# Patient Record
Sex: FEMALE | Race: WHITE | Employment: OTHER | URBAN - METROPOLITAN AREA
[De-identification: names, ages, dates, MRNs, and addresses within clinical notes are randomized per-mention and may not be internally consistent; named-entity substitution may affect disease eponyms.]

---

## 2017-09-07 ENCOUNTER — OFFICE VISIT (OUTPATIENT)
Dept: FAMILY MEDICINE | Facility: OTHER | Age: 62
End: 2017-09-07

## 2017-09-07 ENCOUNTER — RADIANT APPOINTMENT (OUTPATIENT)
Dept: GENERAL RADIOLOGY | Facility: OTHER | Age: 62
End: 2017-09-07
Attending: FAMILY MEDICINE

## 2017-09-07 VITALS
TEMPERATURE: 97.9 F | OXYGEN SATURATION: 98 % | SYSTOLIC BLOOD PRESSURE: 102 MMHG | WEIGHT: 130 LBS | RESPIRATION RATE: 16 BRPM | DIASTOLIC BLOOD PRESSURE: 62 MMHG | HEART RATE: 77 BPM | BODY MASS INDEX: 24.56 KG/M2

## 2017-09-07 DIAGNOSIS — M54.41 ACUTE RIGHT-SIDED LOW BACK PAIN WITH RIGHT-SIDED SCIATICA: ICD-10-CM

## 2017-09-07 DIAGNOSIS — M54.41 ACUTE RIGHT-SIDED LOW BACK PAIN WITH RIGHT-SIDED SCIATICA: Primary | ICD-10-CM

## 2017-09-07 PROCEDURE — 99214 OFFICE O/P EST MOD 30 MIN: CPT | Performed by: FAMILY MEDICINE

## 2017-09-07 PROCEDURE — 72100 X-RAY EXAM L-S SPINE 2/3 VWS: CPT

## 2017-09-07 RX ORDER — CYCLOBENZAPRINE HCL 10 MG
10 TABLET ORAL
Qty: 14 TABLET | Refills: 1 | Status: SHIPPED | OUTPATIENT
Start: 2017-09-07 | End: 2019-07-22

## 2017-09-07 ASSESSMENT — PAIN SCALES - GENERAL: PAINLEVEL: MODERATE PAIN (5)

## 2017-09-07 NOTE — NURSING NOTE
Screening Questionnaire for Adult Immunization    Are you sick today?   No   Do you have allergies to medications, food, a vaccine component or latex?   No   Have you ever had a serious reaction after receiving a vaccination?   No   Do you have a long-term health problem with heart disease, lung disease, asthma, kidney disease, metabolic disease (e.g. diabetes), anemia, or other blood disorder?   No   Do you have cancer, leukemia, HIV/AIDS, or any other immune system problem?   No   In the past 3 months, have you taken medications that affect  your immune system, such as prednisone, other steroids, or anticancer drugs; drugs for the treatment of rheumatoid arthritis, Crohn s disease, or psoriasis; or have you had radiation treatments?   No   Have you had a seizure, or a brain or other nervous system problem?   No   During the past year, have you received a transfusion of blood or blood     products, or been given immune (gamma) globulin or antiviral drug?   No   For women: Are you pregnant or is there a chance you could become        pregnant during the next month?   No   Have you received any vaccinations in the past 4 weeks?   No     Immunization questionnaire answers were all negative.      [facility] doesn't apply on this patient    Prior to injection verified patient identity using patient's name and date of birth.   Patient instructed to remain in clinic for 15 minutes afterwards, and to report any adverse reaction to me immediately.    Injectable Influenza Immunization Documentation    1.  Is the person to be vaccinated sick today?  No    2. Does the person to be vaccinated have an allergy to eggs or to a component of the vaccine?  No    3. Has the person to be vaccinated today ever had a serious reaction to influenza vaccine in the past?  No    4. Has the person to be vaccinated ever had Guillain-[place] syndrome?  No     Form completed by Sandy Wright MA

## 2017-09-07 NOTE — PATIENT INSTRUCTIONS
Try to avoid caffeine and nicotine.   Stay well hydrated.  Adjust sleep positioning to align spine.  Use oral and topical anti-inflammatories to alleviate muscular inflammation (Ibuprofen and Biofreeze are good options).   Use Flexeril as needed to relax muscles before bed. PT if no improvement.

## 2017-09-07 NOTE — LETTER
[address]  [facility] [address]  Phone: [phone]    September 7, 2017        Rach Dick  [address] [facility] [address]          To whom it may concern:    RE: Rach Dick    Patient was seen and treated today at our clinic and missed work.  She may be out for the next few days while healing.    Please contact me for questions or concerns.      Sincerely,        Bobbi Saxena MD, MD

## 2017-09-07 NOTE — MR AVS SNAPSHOT
After Visit Summary   9/7/2017    Rach Dick    MRN: [id number]           Patient Information     Date Of Birth          1955        Visit Information        Provider Department      9/7/2017 4:00 PM Bobbi Saxena MD [facility]        Today's Diagnoses     Acute right-sided low back pain with right-sided sciatica    -  1      Care Instructions    Try to avoid caffeine and nicotine.   Stay well hydrated.  Adjust sleep positioning to align spine.  Use oral and topical anti-inflammatories to alleviate muscular inflammation (Ibuprofen and Biofreeze are good options).   Use Flexeril as needed to relax muscles before bed.           Follow-ups after your visit        Who to contact     If you have questions or need follow up information about today's clinic visit or your schedule please contact [facility] directly at [phone].  Normal or non-critical lab and imaging results will be communicated to you by [employer], letter or phone within 4 business days after the clinic has received the results. If you do not hear from us within 7 days, please contact the clinic through [employer] or phone. If you have a critical or abnormal lab result, we will notify you by phone as soon as possible.  Submit refill requests through [employer] or call your pharmacy and they will forward the refill request to us. Please allow 3 business days for your refill to be completed.          Additional Information About Your Visit        MyChart Information     [employer] gives you secure access to your electronic health record. If you see a primary care provider, you can also send messages to your care team and make appointments. If you have questions, please call your primary care clinic.  If you do not have a primary care provider, please call [phone] and they will assist you.        Care EveryWhere ID     This is your Care EveryWhere ID. This could be used by other organizations to  access your [place] medical records  [id number]        Your Vitals Were     Pulse Temperature Respirations Pulse Oximetry BMI (Body Mass Index)       77 97.9  F (36.6  C) (Temporal) 16 98% 24.56 kg/m2        Blood Pressure from Last 3 Encounters:   09/07/17 102/62   06/29/16 118/80   06/08/16 110/60    Weight from Last 3 Encounters:   09/07/17 130 lb (59 kg)   06/08/16 132 lb (59.9 kg)   03/15/16 120 lb (54.4 kg)                 Today's Medication Changes          These changes are accurate as of: 9/7/17  4:44 PM.  If you have any questions, ask your nurse or doctor.               Start taking these medicines.        Dose/Directions    cyclobenzaprine 10 MG tablet   Commonly known as:  FLEXERIL   Used for:  Acute right-sided low back pain with right-sided sciatica   Started by:  Bobbi Saxena MD        Dose:  10 mg   Take 1 tablet (10 mg) by mouth nightly as needed for muscle spasms   Quantity:  14 tablet   Refills:  1         Stop taking these medicines if you haven't already. Please contact your care team if you have questions.     phenazopyridine 200 MG tablet   Commonly known as:  PYRIDIUM   Stopped by:  Bobbi Saxena MD                Where to get your medicines      These medications were sent to [address]  [address] [facility] [address]     Phone:  [phone]     cyclobenzaprine 10 MG tablet                Primary Care Provider Office Phone # Fax #    Venessa Chavez PA-C [phone] [address] [facility]   [facility] [address]        Equal Access to Services     [facility] AH: Hadii carmen denis hadashred Soanya, waaxda luqadaha, qaybta kaalmada jack, adele alexander. So [facility] [phone].    ATENCIÓN: Si habla español, tiene a cervantes disposición servicios gratuitos de asistencia lingüística. Llame al [phone].    We comply with applicable federal civil rights laws and [place] laws. We do not discriminate on the  basis of race, color, national origin, age, disability sex, sexual orientation or gender identity.            Thank you!     Thank you for choosing [facility]  for your care. Our goal is always to provide you with excellent care. Hearing back from our patients is one way we can continue to improve our services. Please take a few minutes to complete the written survey that you may receive in the mail after your visit with us. Thank you!             Your Updated Medication List - Protect others around you: Learn how to safely use, store and throw away your medicines at www.disposemymeds.org.          This list is accurate as of: 9/7/17  4:44 PM.  Always use your most recent med list.                   Brand Name Dispense Instructions for use Diagnosis    CALCIUM + D PO      2 TABLET ONCE A DAY    Labyrinthitis       chromium 200 MCG Caps capsule      Take 200 mcg by mouth daily.        cyclobenzaprine 10 MG tablet    FLEXERIL    14 tablet    Take 1 tablet (10 mg) by mouth nightly as needed for muscle spasms    Acute right-sided low back pain with right-sided sciatica       GLUCOSAMINE CHONDRO COMPLEX OR      TAKE TWO CAPSULES DAILY.        MULTIVITAMIN PO      TAKE ONE TABLET DAILY.        OMEGA 3 PO      TAKE TWO CAPSULES DAILY.        ZYRTEC ALLERGY 10 MG tablet   Generic drug:  cetirizine      Take 10 mg by mouth daily

## 2017-09-07 NOTE — PROGRESS NOTES
"  SUBJECTIVE:                                                    Rach Dick is a 62 year old female who presents to clinic today for the following health issues:    HPI    Back Pain       Duration: in last week         Specific cause: off and on throughout the summer, sitting/ bending have caused a flare up     Description:   Location of pain: low back right   Character of pain: sharp  Pain radiation:radiates into the right buttocks  New numbness or weakness in legs, not attributed to pain:  no     Intensity: Currently 5/10, At its worst 8/10    History:   Pain interferes with job: YES  History of back problems: pt states she has had some bulging disk problems   Any previous MRI or X-rays: yes but cant remember where   Sees a specialist for back pain:  No  Therapies tried without relief: cold, heat, aleve, stretching     Alleviating factors:   Improved by: no movement       Precipitating factors:  Worsened by: Bending, Sitting and Walking    Functional and Psychosocial Screen (Dian STarT Back):      Not performed today    Accompanying Signs & Symptoms:  Risk of Fracture:  Osteoporosis  Risk of Cauda Equina:  None  Risk of Infection:  None  Risk of Cancer:  None  Risk of Ankylosing Spondylitis:  Onset at age <35, male, AND morning back stiffness. no     Patient states that she has been experiencing back pain since Tuesday (09/05/2017) when she tried to lift a book off of the bottom shelf at her place of employment. She states that she thought it was just a kink in her back, but the pain worsened later that night. She describes that her back seems to \"lock out\" when she stands up. She also explains that she has tried Aleve and heating pads, but these treatments have not alleviated the pain.     Problem list and histories reviewed & adjusted, as indicated.  Additional history: as documented    Patient Active Problem List   Diagnosis     Essential and other specified forms of tremor     Irritable bowel syndrome "     Hypoglycemia     Hip pain     Lumbar radiculopathy     Bunion     Fibrocystic breast changes     CARDIOVASCULAR SCREENING; LDL GOAL LESS THAN 160     Osteopenia     Health Care Home     Advanced directives, counseling/discussion     Pain in shoulder     RSD (reflex sympathetic dystrophy)     Trochanteric bursitis     H/O: hysterectomy     Adenomatous polyp of colon     Past Surgical History:   Procedure Laterality Date     C NONSPECIFIC PROCEDURE      knee surgery     C NONSPECIFIC PROCEDURE  1993    lithotripsy     C OPEN RX DIST RAD/ULNA FX  12/06/2004    ORIF right distal radius fx     C TOTAL ABDOM HYSTERECTOMY      Hysterectomy, Total Abdominal removed one ovary     COLONOSCOPY  10/27/08     HC COLONOSCOPY W/WO BRUSH/WASH  09/12/05     HC CYSTOURETHROSCOPY  10/18/2004    Vaginal biopsy with cystoscopic guidance.     HC EXCISION BREAST LESION W XRAY MARKER, OPEN SINGLE  4/10/2007    Left      HC REMOVAL GALLBLADDER  1991    Cholecystectomy       Social History   Substance Use Topics     Smoking status: Never Smoker     Smokeless tobacco: Never Used      Comment: no smokers in the household     Alcohol use No     Family History   Problem Relation Age of Onset     CANCER Other      DIABETES Other      DIABETES Other      Breast Cancer Other      ? breast - Pat GM     Psychotic Disorder Mother      bi-polar     OSTEOPOROSIS Mother      Neurologic Disorder Father      parkinsons     HEART DISEASE Father      heart murmur     Psychotic Disorder Sister      both sisters bi-polar         ROS:  Constitutional, HEENT, cardiovascular, pulmonary, GI, , musculoskeletal, neuro, skin, endocrine and psych systems are negative, except as in HPI or otherwise noted.     This document serves as a record of the services and decisions personally performed and made by Bobbi Saxena MD. It was created on her behalf by Carlos Eduardo Parry, a trained medical scribe. The creation of this document is based the provider's statements to the  medical scribe.  Carlos Eduardo Parry, September 7, 2017 4:12 PM     OBJECTIVE:                                                    /62  Pulse 77  Temp 97.9  F (36.6  C) (Temporal)  Resp 16  Wt 59 kg (130 lb)  SpO2 98%  BMI 24.56 kg/m2  Body mass index is 24.56 kg/(m^2).   GENERAL: healthy, alert, well nourished, well hydrated, no distress  SKIN: no suspicious lesions, no rashes to visible skin  PSYCH: Alert and oriented times 3; speech- coherent , normal rate and volume; able to articulate logical thoughts, able to abstract reason, no tangential thoughts, no hallucinations or delusions, affect- normal    Back Exam     Tenderness   The patient is experiencing tenderness in the lumbar.    Range of Motion   Flexion:                   Normal  Extension:                 Lateral Bend Left:    Normal  Lateral Bend Right:  Abnormal  Rotation Right:         Abnormal  Rotation Left:           Normal    Reflexes   Patellar:  Hyperreflexic  Achilles:  Normal    Comments:  Pain upon right lateral bend and inability to rotate right.     Right side paraspinous muscle discomfort. Paraspinous muscle spasm and irritation to the right side.          No results found for this or any previous visit (from the past 24 hour(s)).       ASSESSMENT/PLAN:                                                        ICD-10-CM    1. Acute right-sided low back pain with right-sided sciatica M54.41 XR Lumbar Spine 2/3 Views       Discussed possibility of degenerative disc disease or bulging disc causign her pain. Patient was Advised to reduce caffeine and nicotine consumption. She was also advised to remain hydrated. Options of injections, physical therapy, and muscle relaxants are discussed. Treatment option will be further discussed upon examination of lumbar XR.     Lumbar XR ordered. Normal results were noted.     After examination of the XR, injections would not be a good treatment option. The patient was advised to adjust her sleep  positioning to align the spine and give the muscles space to relax. Inflammation of the muscles can be treated with oral and topical anti-inflammatories. Patient was also advised to use Flexeril before bed for muscle relaxation.    Work form was filled out for patient in case her pain interferes with her work responsibilities.     Medications ordered (see above). Side effects were discussed and were understood by the patient.     Patient Instructions   Try to avoid caffeine and nicotine.   Stay well hydrated.    The information in this document, created by the medical scribe for me, accurately reflects the services I personally performed and the decisions made by me. I have reviewed and approved this document for accuracy.   MD Bobbi Webster MD, MD  [facility]

## 2017-09-08 NOTE — PROGRESS NOTES
Rach, your results were as discussed in clinic.  Please let me know if you have any questions.    Bobbi Saxena MD

## 2017-10-20 ENCOUNTER — TRANSFERRED RECORDS (OUTPATIENT)
Dept: HEALTH INFORMATION MANAGEMENT | Facility: CLINIC | Age: 62
End: 2017-10-20

## 2017-10-23 ENCOUNTER — OFFICE VISIT (OUTPATIENT)
Dept: ORTHOPEDICS | Facility: CLINIC | Age: 62
End: 2017-10-23
Payer: COMMERCIAL

## 2017-10-23 ENCOUNTER — RADIANT APPOINTMENT (OUTPATIENT)
Dept: GENERAL RADIOLOGY | Facility: CLINIC | Age: 62
End: 2017-10-23
Attending: ORTHOPAEDIC SURGERY
Payer: COMMERCIAL

## 2017-10-23 ENCOUNTER — TELEPHONE (OUTPATIENT)
Dept: FAMILY MEDICINE | Facility: CLINIC | Age: 62
End: 2017-10-23

## 2017-10-23 VITALS — WEIGHT: 126 LBS | BODY MASS INDEX: 23.79 KG/M2 | TEMPERATURE: 98 F | HEIGHT: 61 IN

## 2017-10-23 DIAGNOSIS — M25.572 ANKLE PAIN, LEFT: ICD-10-CM

## 2017-10-23 DIAGNOSIS — S82.65XA CLOSED NONDISPLACED FRACTURE OF LATERAL MALLEOLUS OF LEFT FIBULA, INITIAL ENCOUNTER: Primary | ICD-10-CM

## 2017-10-23 PROCEDURE — 73610 X-RAY EXAM OF ANKLE: CPT | Mod: TC

## 2017-10-23 PROCEDURE — 27786 TREATMENT OF ANKLE FRACTURE: CPT | Mod: LT | Performed by: ORTHOPAEDIC SURGERY

## 2017-10-23 ASSESSMENT — PAIN SCALES - GENERAL: PAINLEVEL: NO PAIN (0)

## 2017-10-23 NOTE — LETTER
10/23/2017         RE: Rach Dick  [address] [facility] [address]        Dear Colleague,    Thank you for referring your patient, Rach Dick, to the [facility]. Please see a copy of my visit note below.    Rach Dick is a 62 year old female who is seen in consultation at the request of .  History of Present illness:  Rach presents for evaluation of:  1.) left tib fib /ankle  2.)   Onset: 10/20/17  Symptoms brought on by fall.   Character:  sharp, dull ache, stabbing and throbbing.    Progression of symptoms:  better.    Previous similar pain: no .   Pain Level:  1/10.   Previous treatments:  support wrap and Ibuprofen.  Currently on Blood thinners? no  Diagnosis of Diabetes? 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    She is being seen for   Chief Complaints and History of Present Illnesses   Patient presents with     Consult     left tib fib fx /ankle         History of Present Illness:   Rach Dick is a 62 year old female who is seen in consultation at the request of .  History of Present illness:  Rach presents for evaluation of:  1.) left tib fib /ankle  Onset: 10/20/17, patient was walking down a decline and slipped on some pebble gravel and injured her left ankle.  Symptoms brought on by fall.   Character:  sharp, dull ache, stabbing and throbbing.    Progression of symptoms:  better.    Previous similar pain: no. Patient denies any previous surgery or major injury of the left ankle   Pain Level:  1/10.   Previous treatments:  patient was seen on the day she was injured at [facility] in the emergency department. X-rays were done and she was placed in a splint that was molded for reduction. Patient was given Norco as well as ibuprofen 600 mg. She states that she took the dark only for a day  because she did not like how it made her feel. She is taking ibuprofen 600 mg since then. She has been using crutches.  Currently on Blood thinners? no  Diagnosis of Diabetes? no  Additional History: patient denies any major numbness and tingling just some on and off tingling of her toes.    Patient's past medical, surgical, social and family histories reviewed.     Past Medical History:   Diagnosis Date     Adenomatous polyp of colon 2013    q 5 yr colonoscopy recommended     Calculus of kidney     recurrent     H/O: hysterectomy     PAPS NO LONGER INDICATED     Hemangioma NOS 2008    ? diagnosis liver     Lumbago      Lumbar radiculopathy 11/17/2008    L5 nerve impingement - see MRI     Mild intermittent asthma 7/12/2001     Nausea with vomiting      Unspecified gastritis and gastroduodenitis without mention of hemorrhage          Past Surgical History:   Procedure Laterality Date     C NONSPECIFIC PROCEDURE      knee surgery     C NONSPECIFIC PROCEDURE  1993    lithotripsy     C OPEN RX DIST RAD/ULNA FX  12/06/2004    ORIF right distal radius fx     C TOTAL ABDOM HYSTERECTOMY      Hysterectomy, Total Abdominal removed one ovary     COLONOSCOPY  10/27/08     HC COLONOSCOPY W/WO BRUSH/WASH  09/12/05     HC CYSTOURETHROSCOPY  10/18/2004    Vaginal biopsy with cystoscopic guidance.     HC EXCISION BREAST LESION W XRAY MARKER, OPEN SINGLE  4/10/2007    Left      HC REMOVAL GALLBLADDER  1991    Cholecystectomy       Medications:    Current Outpatient Prescriptions on File Prior to Visit:  cyclobenzaprine (FLEXERIL) 10 MG tablet Take 1 tablet (10 mg) by mouth nightly as needed for muscle spasms   cetirizine (ZYRTEC ALLERGY) 10 MG tablet Take 10 mg by mouth daily   chromium 200 MCG CAPS Take 200 mcg by mouth daily.   CALCIUM + D OR 2 TABLET ONCE A DAY   OMEGA 3 OR TAKE TWO CAPSULES DAILY.   GLUCOSAMINE CHONDRO COMPLEX OR TAKE TWO CAPSULES DAILY.   MULTIVITAMIN OR TAKE ONE TABLET DAILY.     No current  "facility-administered medications on file prior to visit.     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 breast - Pat GM     Psychotic Disorder Mother      bi-polar     OSTEOPOROSIS Mother      Neurologic Disorder Father      parkinsons     HEART DISEASE Father      heart murmur     Psychotic Disorder Sister      both sisters bi-polar       REVIEW OF SYSTEMS  10 point review systems performed otherwise negative as noted as per history of present illness.    Physical Exam:  Vitals: Temp 98  F (36.7  C)  Ht 1.549 m (5' 1\")  Wt 57.2 kg (126 lb)  BMI 23.81 kg/m2  BMI= Body mass index is 23.81 kg/(m^2).    Constitutional: healthy, alert and no acute distress   Psychiatric: mentation appears normal and affect normal/bright  NEURO: no focal deficits, CMS intact left lower extremity of the area that I can examine which is her upper leg and toes because the splint is in place.  RESP: Normal with easy respirations and no use of accessory muscles to breathe, no audible wheezing or retractions  CV: left toes all moving and toes are warm to the touch.  SKIN: No erythema, rashes, excoriation, or breakdown. No evidence of infection Of the skin that I can see today pit, patient has a short leg splint on. No abrasions from the " splint.  MUSCULOSKELETAL:    INSPECTION of left leg/ankle: No gross deformities, erythema, edema, ecchymosis, atrophy or fasciculations of the area that I can see as the patient does have a left short leg splints placed currently.    PALPATION: no tenderness to palpation of the toes or upper leg or knee. No increased once noted.    ROM: moving all toes in moving knee without problems.     STRENGTH: able to move toes against gravity and flex quad.    SPECIAL TEST: none today.  GAIT: not assessed. Patient presents in a wheelchair  Lymph: no palpable lymph nodes    Diagnostic Modalities:  Recent Results (from the past 744 hour(s))   XR Ankle Left G/E 3 Views    Narrative    ANKLE LEFT THREE OR MORE VIEWS October 23, 2017 12:30 PM     HISTORY: Pain in left ankle and joints of left foot.      Impression    IMPRESSION: Distal fibular fracture is somewhat obscured by overlying  cast material. Small cortical fracture is noted along the medial  margin of the medial malleolus. Although involvement of both the  medial and lateral malleoli suggests this could be an unstable injury,  the ankle mortise appears congruent.    YARI WASHINGTON MD     Agree with the above imaging.   We also did review the patient's x-rays from 10/20/2017 that came from [facility]. This was on a CD. We can see that the lateral malleolus is fractured and slightly displaced opening up the mortise on the lateral side slightly compared to the medial side. We also see a connie of bone on the medial side has been fractured which is also noted in today's films. No other fracture dislocations or tumors.  Independent visualization of the images was performed.    Impression: 3 days status post left distal fibula fracture and small fracture on the medial distal tibia also.    Plan:  All of the above pertinent physical exam and imaging modalities findings was reviewed with Rach.                                          CONSERVATIVE CARE:    Patient  Instructions:  Plan:  1. Xrays: We looked at your xrays on the CD from [facility] and we compared that to the xrays we did today.  They did a good job of reducing your fracture, it is in good position now.  You have a fracture of the outside bone which is your fibula, the smaller bone. Also there is a connie of bone on the inside part of your ankle also but this is not anything to worry about.   2. Anticoagulation: Walking/Crutching is enough  3. Pain Medication: Norco does not work well with you, Continue with the Ibuprofen 600mg  4. Weight Bearing: Non weight bearing left lower extremity   5. Activity/Therapy: elevate the extremity above your heart level is much as possible, ice and decreased activity. Range of motion of toes and knee as needed.  We are working on getting you a rolling walker that is like a scooter that you rest your knee on. This might be more functional when you go back to work.  6. Cast/Splint/Brace/Sling: continue with the splint you are wearing. Since you are in a good position in the splint is fitting well we will continue in the splint for a total of 4 weeks. We gave you some moleskin to pad any areas that could be pushing against her skin.  7. Work: we wrote you a work note that states off times 2 months however we know that we might get you back sooner than that depending on how you are doing.  Follow-up:  Follow up with Jazmin Alfaro MD and/or Raymundo Forbes PA-C in 2 weeks. We will also see you 2 weeks after that and at that time we are hoping to get you out of the splintered into a CAM boot.  Re-x-ray on return: yes, left ankle AP lateral mortise views done with the splint on    Scribed by Raymundo Forbes PA-C on 10/23/2017 at 1:22 PM, based on Dr. Jazmin Alfaro's statements to me.    This note was dictated with [employer].    KUMAR Covington MD      Again, thank you for allowing me to participate in the care of your patient.         Sincerely,        Jazmin Alfaro MD

## 2017-10-23 NOTE — MR AVS SNAPSHOT
After Visit Summary   10/23/2017    Rach Dick    MRN: [id number]           Patient Information     Date Of Birth          1955        Visit Information        Provider Department      10/23/2017 12:30 PM Jazmin Alfaro MD [facility]        Today's Diagnoses     Closed nondisplaced fracture of lateral malleolus of left fibula, initial encounter    -  1      Care Instructions    Encounter Diagnosis   Name Primary?     Closed nondisplaced fracture of lateral malleolus of left fibula, initial encounter Yes     Rest, ice and elevate above heart level as needed for pain control  Plan:  1. Xrays: We looked at your xrays on the CD from [facility] ED and we compared that to the xrays we did today.  They did a good job of reducing your fracture, it is in good position now.  You have a fracture of the outside bone which is your fibula, the smaller bone. Also there is a connie of bone on the inside part of your ankle also but this is not anything to worry about.   2. Anticoagulation: Walking/Crutching is enough  3. Pain Medication: Norco does not work well with you, Continue with the Ibuprofen 600mg  4. Weight Bearing: Non weight bearing left lower extremity   5. Activity/Therapy: elevate the extremity above your heart level is much as possible, ice and decreased activity. Range of motion of toes and knee as needed.  We are working on getting you a rolling walker that is like a scooter that you rest your knee on. This might be more functional when you go back to work.  6. Cast/Splint/Brace/Sling: continue with the splint you are wearing. Since you are in a good position in the splint is fitting well we will continue in the splint for a total of 4 weeks. We gave you some moleskin to pad any areas that could be pushing against her skin.  7. Work: we wrote you a work note that states off times 2 months however we know that we might get you back sooner than that  depending on how you are doing.  Follow-up:  Follow up with Jazmin Alfaro MD and/or Raymundo Forbes PA-C in 2 weeks. We will also see you 2 weeks after that and at that time we are hoping to get you out of the splintered into a CAM boot.    [employer] and [employer] may offer reliable information regarding your diagnosis and treatment plan.    THANK YOU for coming in today. If you receive a survey via [employer] or mail please let us know if there was anything you especially appreciated today or if there is any way we can improve our clinic. We appreciate your input.    GENERAL INFORMATION:  Our hours are:  Monday :     Clinic 7:30 AM-430 PM ([facility])  Tuesday:      Operating Room All Day ([facility])  Wednesday: Clinic 7:30 AM - 11:15 AM ([facility])                        Clinic 1:00 PM - 4:00PM ([facility])  Thursday:     Administrative Day  Friday:          Clinic 7:30 AM - 11:15 AM ([facility])                       Clinic 1:00 PM - 4:00 PM ([facility])    Bone and Joint Service Line for any issues or concerns: [phone]      We are not in the office Thursdays. Therefore non- urgent calls and medical messages received on Thursday will be addressed when we are back in the office on Wednesday. Urgent matters will be reviewed and addressed by one of our partners in the office as needed.    If lab work was done today as part of your evaluation you will generally be contacted via Win[employer], mail, or phone with the results within 1-5 days. If there is an alarming result we will contact you by phone. Lab results come back at varying times, I generally wait until all labs are resulted before making comments on results. Please note labs are automatically released to [employer] (if you have signed up for it) once available-at times you may see these prior to my having a chance to review them  "as well.    If you need refills please contact your pharmacist. They will send a refill request to me to review. Please allow 3 business days for us to process all refill requests. All narcotic refills should be handled in the clinic at the time of your visit.            Follow-ups after your visit        Who to contact     If you have questions or need follow up information about today's clinic visit or your schedule please contact [facility] directly at [phone].  Normal or non-critical lab and imaging results will be communicated to you by vendome 1699hart, letter or phone within 4 business days after the clinic has received the results. If you do not hear from us within 7 days, please contact the clinic through [employer] or phone. If you have a critical or abnormal lab result, we will notify you by phone as soon as possible.  Submit refill requests through Chat& (ChatAnd) or call your pharmacy and they will forward the refill request to us. Please allow 3 business days for your refill to be completed.          Additional Information About Your Visit        vendome 1699har[employer] Information     Chat& (ChatAnd) gives you secure access to your electronic health record. If you see a primary care provider, you can also send messages to your care team and make appointments. If you have questions, please call your primary care clinic.  If you do not have a primary care provider, please call [phone] and they will assist you.        Care EveryWhere ID     This is your Care EveryWhere ID. This could be used by other organizations to access your [facility] medical records  [id number]        Your Vitals Were     Temperature Height BMI (Body Mass Index)             98  F (36.7  C) 1.549 m (5' 1\") 23.81 kg/m2          Blood Pressure from Last 3 Encounters:   09/07/17 102/62   06/29/16 118/80   06/08/16 110/60    Weight from Last 3 Encounters:   10/23/17 57.2 kg (126 lb)   09/07/17 59 kg (130 lb)   06/08/16 59.9 kg (132 lb)               " Primary Care Provider Office Phone # Fax #    Venessa Chavez PA-C [phone] [address] [place] [address]        Equal Access to Services     SHRUTHI AWAN : Hadderrick carmen denis jossue Soanya, waaxda luqadaha, qaybta kaalmada jack, adele espinoza laJackelinejennifer alexander. So [facility] [phone].    ATENCIÓN: Si habla español, tiene a cervantes disposición servicios gratuitos de asistencia lingüística. Llame al [phone].    We comply with applicable federal civil rights laws and [place] laws. We do not discriminate on the basis of race, color, national origin, age, disability, sex, sexual orientation, or gender identity.            Thank you!     Thank you for choosing [facility]  for your care. Our goal is always to provide you with excellent care. Hearing back from our patients is one way we can continue to improve our services. Please take a few minutes to complete the written survey that you may receive in the mail after your visit with us. Thank you!             Your Updated Medication List - Protect others around you: Learn how to safely use, store and throw away your medicines at www.disposemymeds.org.          This list is accurate as of: 10/23/17  1:16 PM.  Always use your most recent med list.                   Brand Name Dispense Instructions for use Diagnosis    CALCIUM + D PO      2 TABLET ONCE A DAY    Labyrinthitis       chromium 200 MCG Caps capsule      Take 200 mcg by mouth daily.        cyclobenzaprine 10 MG tablet    FLEXERIL    14 tablet    Take 1 tablet (10 mg) by mouth nightly as needed for muscle spasms    Acute right-sided low back pain with right-sided sciatica       GLUCOSAMINE CHONDRO COMPLEX OR      TAKE TWO CAPSULES DAILY.        MULTIVITAMIN PO      TAKE ONE TABLET DAILY.        OMEGA 3 PO      TAKE TWO CAPSULES DAILY.        ZYRTEC ALLERGY 10 MG tablet   Generic drug:  cetirizine      Take 10 mg by mouth daily

## 2017-10-23 NOTE — LETTER
[address] [address]  Phone: [phone]  Fax: [phone]    October 23, 2017        Rach Dick  [address] [facility] [address]          To whom it may concern:    RE: Rach Dick    Patient was seen and treated today at our clinic. She will be off work for 2 months.  Rach will be following up with us about every 2 weeks.  If she can return to work sooner will send another letter at that time.     Please contact me for questions or concerns.      Sincerely,        Jazmin Alfaro MD

## 2017-10-23 NOTE — NURSING NOTE
"Chief Complaint   Patient presents with     Consult     left tib fib fx /ankle       Initial Temp 98  F (36.7  C)  Ht 1.549 m (5' 1\")  Wt 57.2 kg (126 lb)  BMI 23.81 kg/m2 Estimated body mass index is 23.81 kg/(m^2) as calculated from the following:    Height as of this encounter: 1.549 m (5' 1\").    Weight as of this encounter: 57.2 kg (126 lb).  Medication Reconciliation: complete    BP completed using cuff size: NA (Not Taken)    Yenny Cordon MA      "

## 2017-10-23 NOTE — PROGRESS NOTES
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    She is being seen for   Chief Complaints and History of Present Illnesses   Patient presents with     Consult     left tib fib fx /ankle         History of Present Illness:   Rach Dick is a 62 year old female who is seen in consultation at the request of .  History of Present illness:  Rach presents for evaluation of:  1.) left tib fib /ankle  Onset: 10/20/17, patient was walking down a decline and slipped on some pebble gravel and injured her left ankle.  Symptoms brought on by fall.   Character:  sharp, dull ache, stabbing and throbbing.    Progression of symptoms:  better.    Previous similar pain: no. Patient denies any previous surgery or major injury of the left ankle   Pain Level:  1/10.   Previous treatments:  patient was seen on the day she was injured at [facility] in the emergency department. X-rays were done and she was placed in a splint that was molded for reduction. Patient was given Norco as well as ibuprofen 600 mg. She states that she took the dark only for a day because she did not like how it made her feel. She is taking ibuprofen 600 mg since then. She has been using crutches.  Currently on Blood thinners? no  Diagnosis of Diabetes? no  Additional History: patient denies any major numbness and tingling just some on and off tingling of her toes.    Patient's past medical, surgical, social and family histories reviewed.     Past Medical History:   Diagnosis Date     Adenomatous polyp of colon 2013    q 5 yr colonoscopy recommended     Calculus of kidney     recurrent     H/O: hysterectomy     PAPS NO LONGER INDICATED     Hemangioma NOS 2008    ? diagnosis liver     Lumbago      Lumbar radiculopathy 11/17/2008    L5 nerve impingement - see MRI     Mild intermittent asthma 7/12/2001     Nausea  with vomiting      Unspecified gastritis and gastroduodenitis without mention of hemorrhage          Past Surgical History:   Procedure Laterality Date     C NONSPECIFIC PROCEDURE      knee surgery     C NONSPECIFIC PROCEDURE  1993    lithotripsy     C OPEN RX DIST RAD/ULNA FX  12/06/2004    ORIF right distal radius fx     C TOTAL ABDOM HYSTERECTOMY      Hysterectomy, Total Abdominal removed one ovary     COLONOSCOPY  10/27/08     HC COLONOSCOPY W/WO BRUSH/WASH  09/12/05     HC CYSTOURETHROSCOPY  10/18/2004    Vaginal biopsy with cystoscopic guidance.     HC EXCISION BREAST LESION W XRAY MARKER, OPEN SINGLE  4/10/2007    Left      HC REMOVAL GALLBLADDER  1991    Cholecystectomy       Medications:    Current Outpatient Prescriptions on File Prior to Visit:  cyclobenzaprine (FLEXERIL) 10 MG tablet Take 1 tablet (10 mg) by mouth nightly as needed for muscle spasms   cetirizine (ZYRTEC ALLERGY) 10 MG tablet Take 10 mg by mouth daily   chromium 200 MCG CAPS Take 200 mcg by mouth daily.   CALCIUM + D OR 2 TABLET ONCE A DAY   OMEGA 3 OR TAKE TWO CAPSULES DAILY.   GLUCOSAMINE CHONDRO COMPLEX OR TAKE TWO CAPSULES DAILY.   MULTIVITAMIN OR TAKE ONE TABLET DAILY.     No current facility-administered medications on file prior to visit.     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 breast - Pat GM     Psychotic Disorder Mother      bi-polar     OSTEOPOROSIS Mother      Neurologic Disorder Father      parkinsons     HEART DISEASE Father      heart murmur     Psychotic Disorder Sister      both sisters bi-polar       REVIEW OF SYSTEMS  10 point review systems performed otherwise negative as noted as per history of present illness.    Physical Exam:  Vitals: Temp 98  F (36.7  C)  Ht 1.549 m (5' 1\")  Wt 57.2 kg (126 lb)  BMI 23.81 kg/m2  BMI= Body mass index is 23.81 kg/(m^2).    Constitutional: healthy, alert and no acute distress   Psychiatric: mentation appears normal and affect normal/bright  NEURO: no focal deficits, CMS intact left lower extremity of the area that I can examine which is her upper leg and toes because the splint is in place.  RESP: Normal with easy respirations and no use of accessory muscles to breathe, no audible wheezing or retractions  CV: left toes all moving and toes are warm to the touch.  SKIN: No erythema, rashes, excoriation, or breakdown. No evidence of infection Of the skin that I can see today pit, patient has a short leg splint on. No abrasions from the splint.  MUSCULOSKELETAL:    INSPECTION of left leg/ankle: No gross deformities, erythema, edema, ecchymosis, atrophy or fasciculations of the area that I can see as the patient does have a left short leg splints placed currently.    PALPATION: no tenderness to palpation of the toes or upper leg or knee. No increased once noted.    ROM: moving all toes in moving knee without problems.     STRENGTH: able to move toes against gravity and flex quad.    SPECIAL TEST: none today.  GAIT: not assessed. Patient presents in a wheelchair  Lymph: no palpable lymph nodes    Diagnostic Modalities:  Recent Results (from the past 744 hour(s))   XR Ankle Left G/E 3 Views    Narrative    ANKLE LEFT THREE OR MORE VIEWS October 23, 2017 12:30 PM     HISTORY: Pain in left ankle and joints of " left foot.      Impression    IMPRESSION: Distal fibular fracture is somewhat obscured by overlying  cast material. Small cortical fracture is noted along the medial  margin of the medial malleolus. Although involvement of both the  medial and lateral malleoli suggests this could be an unstable injury,  the ankle mortise appears congruent.    YARI WASHINGTON MD     Agree with the above imaging.   We also did review the patient's x-rays from 10/20/2017 that came from [facility]. This was on a CD. We can see that the lateral malleolus is fractured and slightly displaced opening up the mortise on the lateral side slightly compared to the medial side. We also see a connie of bone on the medial side has been fractured which is also noted in today's films. No other fracture dislocations or tumors.  Independent visualization of the images was performed.    Impression: 3 days status post left distal fibula fracture and small fracture on the medial distal tibia also.    Plan:  All of the above pertinent physical exam and imaging modalities findings was reviewed with Rach.                                          CONSERVATIVE CARE:    Patient Instructions:  Plan:  1. Xrays: We looked at your xrays on the CD from [facility] ED and we compared that to the xrays we did today.  They did a good job of reducing your fracture, it is in good position now.  You have a fracture of the outside bone which is your fibula, the smaller bone. Also there is a connie of bone on the inside part of your ankle also but this is not anything to worry about.   2. Anticoagulation: Walking/Crutching is enough  3. Pain Medication: Norco does not work well with you, Continue with the Ibuprofen 600mg  4. Weight Bearing: Non weight bearing left lower extremity   5. Activity/Therapy: elevate the extremity above your heart level is much as possible, ice and decreased activity. Range of motion of toes and knee as needed.  We are  working on getting you a rolling walker that is like a scooter that you rest your knee on. This might be more functional when you go back to work.  6. Cast/Splint/Brace/Sling: continue with the splint you are wearing. Since you are in a good position in the splint is fitting well we will continue in the splint for a total of 4 weeks. We gave you some moleskin to pad any areas that could be pushing against her skin.  7. Work: we wrote you a work note that states off times 2 months however we know that we might get you back sooner than that depending on how you are doing.  Follow-up:  Follow up with Jazmin Alfaro MD and/or Raymundo Forbes PA-C in 2 weeks. We will also see you 2 weeks after that and at that time we are hoping to get you out of the splintered into a CAM boot.  Re-x-ray on return: yes, left ankle AP lateral mortise views done with the splint on    Scribed by Raymundo Forbes PA-C on 10/23/2017 at 1:22 PM, based on Dr. Jazmin Alfaro's statements to me.    This note was dictated with [employer].    KUMAR Covington MD

## 2017-10-23 NOTE — PATIENT INSTRUCTIONS
Encounter Diagnosis   Name Primary?     Closed nondisplaced fracture of lateral malleolus of left fibula, initial encounter Yes     Rest, ice and elevate above heart level as needed for pain control  Plan:  1. Xrays: We looked at your xrays on the CD from [facility] ED and we compared that to the xrays we did today.  They did a good job of reducing your fracture, it is in good position now.  You have a fracture of the outside bone which is your fibula, the smaller bone. Also there is a connie of bone on the inside part of your ankle also but this is not anything to worry about.   2. Anticoagulation: Walking/Crutching is enough  3. Pain Medication: Norco does not work well with you, Continue with the Ibuprofen 600mg  4. Weight Bearing: Non weight bearing left lower extremity   5. Activity/Therapy: elevate the extremity above your heart level is much as possible, ice and decreased activity. Range of motion of toes and knee as needed.  We are working on getting you a rolling walker that is like a scooter that you rest your knee on. This might be more functional when you go back to work.  6. Cast/Splint/Brace/Sling: continue with the splint you are wearing. Since you are in a good position in the splint is fitting well we will continue in the splint for a total of 4 weeks. We gave you some moleskin to pad any areas that could be pushing against her skin.  7. Work: we wrote you a work note that states off times 2 months however we know that we might get you back sooner than that depending on how you are doing.  Follow-up:  Follow up with Jazmin Alfaro MD and/or Raymundo Forbes PA-C in 2 weeks. We will also see you 2 weeks after that and at that time we are hoping to get you out of the splintered into a CAM boot.    WebMD and [email or web address] may offer reliable information regarding your diagnosis and treatment plan.    THANK YOU for coming in today. If you receive a survey via [employer] or mail please let  us know if there was anything you especially appreciated today or if there is any way we can improve our clinic. We appreciate your input.    GENERAL INFORMATION:  Our hours are:  Monday :     Clinic 7:30 AM-430 PM ([facility])  Tuesday:      Operating Room All Day ([facility])  Wednesday: Clinic 7:30 AM - 11:15 AM ([facility])                        Clinic 1:00 PM - 4:00PM ([facility])  Thursday:     Administrative Day  Friday:          Clinic 7:30 AM - 11:15 AM ([facility])                       Clinic 1:00 PM - 4:00 PM ([facility])    Bone and Joint Service Line for any issues or concerns: [phone]      We are not in the office Thursdays. Therefore non- urgent calls and medical messages received on Thursday will be addressed when we are back in the office on Wednesday. Urgent matters will be reviewed and addressed by one of our partners in the office as needed.    If lab work was done today as part of your evaluation you will generally be contacted via [employer], mail, or phone with the results within 1-5 days. If there is an alarming result we will contact you by phone. Lab results come back at varying times, I generally wait until all labs are resulted before making comments on results. Please note labs are automatically released to [employer] (if you have signed up for it) once available-at times you may see these prior to my having a chance to review them as well.    If you need refills please contact your pharmacist. They will send a refill request to me to review. Please allow 3 business days for us to process all refill requests. All narcotic refills should be handled in the clinic at the time of your visit.

## 2017-10-23 NOTE — PROGRESS NOTES
Rach Dick is a 62 year old female who is seen in consultation at the request of .  History of Present illness:  Rach presents for evaluation of:  1.) left tib fib /ankle  2.)   Onset: 10/20/17  Symptoms brought on by fall.   Character:  sharp, dull ache, stabbing and throbbing.    Progression of symptoms:  better.    Previous similar pain: no .   Pain Level:  1/10.   Previous treatments:  support wrap and Ibuprofen.  Currently on Blood thinners? no  Diagnosis of Diabetes? no

## 2017-11-01 ENCOUNTER — RADIANT APPOINTMENT (OUTPATIENT)
Dept: GENERAL RADIOLOGY | Facility: CLINIC | Age: 62
End: 2017-11-01
Attending: ORTHOPAEDIC SURGERY
Payer: COMMERCIAL

## 2017-11-01 ENCOUNTER — OFFICE VISIT (OUTPATIENT)
Dept: ORTHOPEDICS | Facility: CLINIC | Age: 62
End: 2017-11-01
Payer: COMMERCIAL

## 2017-11-01 VITALS — BODY MASS INDEX: 23.79 KG/M2 | HEIGHT: 61 IN | WEIGHT: 126 LBS | TEMPERATURE: 96 F

## 2017-11-01 DIAGNOSIS — S82.65XA CLOSED NONDISPLACED FRACTURE OF LATERAL MALLEOLUS OF LEFT FIBULA, INITIAL ENCOUNTER: Primary | ICD-10-CM

## 2017-11-01 DIAGNOSIS — M25.572 ANKLE PAIN, LEFT: ICD-10-CM

## 2017-11-01 PROCEDURE — 99207 ZZC FRACTURE CARE IN GLOBAL PERIOD: CPT | Performed by: ORTHOPAEDIC SURGERY

## 2017-11-01 PROCEDURE — 73610 X-RAY EXAM OF ANKLE: CPT | Mod: TC

## 2017-11-01 PROCEDURE — 29405 APPL SHORT LEG CAST: CPT | Mod: 58 | Performed by: ORTHOPAEDIC SURGERY

## 2017-11-01 RX ORDER — TRAMADOL HYDROCHLORIDE 50 MG/1
50 TABLET ORAL EVERY 12 HOURS PRN
Qty: 40 TABLET | Refills: 2 | Status: SHIPPED | OUTPATIENT
Start: 2017-11-01 | End: 2019-07-22

## 2017-11-01 RX ORDER — IBUPROFEN 800 MG/1
800 TABLET, FILM COATED ORAL EVERY 8 HOURS PRN
Qty: 60 TABLET | Refills: 1 | Status: SHIPPED | OUTPATIENT
Start: 2017-11-01 | End: 2019-07-22

## 2017-11-01 ASSESSMENT — PAIN SCALES - GENERAL: PAINLEVEL: MILD PAIN (3)

## 2017-11-01 NOTE — MR AVS SNAPSHOT
After Visit Summary   11/1/2017    Rach Dick    MRN: [id number]           Patient Information     Date Of Birth          1955        Visit Information        Provider Department      11/1/2017 2:40 PM Jazmin Alfaro MD [facility]        Today's Diagnoses     Closed nondisplaced fracture of lateral malleolus of left fibula, initial encounter    -  1    Ankle pain, left           Follow-ups after your visit        Your next 10 appointments already scheduled     Nov 08, 2017  1:00 PM CST   [facility] with [facility]azmin [facility]faro [facility]   [facility]    [address] [place] [id number]   [phone]              Future tests that were ordered for you today     Open Future Orders        Priority Expected Expires Ordered    XR Ankle Left G/E 3 Views Routine 11/8/2017 10/31/2018 10/31/2017            Who to contact     If you have questions or need follow up information about today's clinic visit or your schedule please contact [facility] directly at [phone].  Normal or non-critical lab and imaging results will be communicated to you by [employer], letter or phone within 4 business days after the clinic has received the results. If you do not hear from us within 7 days, please contact the clinic through [employer] or phone. If you have a critical or abnormal lab result, we will notify you by phone as soon as possible.  Submit refill requests through [facility] or call your pharmacy and they will forward the refill request to us. Please allow 3 business days for your refill to be completed.          Additional Information About Your Visit        [employer] Information     [facility] gives you secure access to your electronic health record. If you see a primary care provider, you can also send messages to your care team and make appointments. If you have questions, please call your primary care clinic.  If you do not have  "a primary care provider, please call [phone] and they will assist you.        Care EveryWhere ID     This is your Care EveryWhere ID. This could be used by other organizations to access your [facility] medical records  [id number]        Your Vitals Were     Temperature Height BMI (Body Mass Index)             96  F (35.6  C) 1.549 m (5' 1\") 23.81 kg/m2          Blood Pressure from Last 3 Encounters:   09/07/17 102/62   06/29/16 118/80   06/08/16 110/60    Weight from Last 3 Encounters:   11/01/17 57.2 kg (126 lb)   10/23/17 57.2 kg (126 lb)   09/07/17 59 kg (130 lb)                 Today's Medication Changes          These changes are accurate as of: 11/1/17  4:06 PM.  If you have any questions, ask your nurse or doctor.               Start taking these medicines.        Dose/Directions    ibuprofen 800 MG tablet   Commonly known as:  ADVIL/MOTRIN   Used for:  Closed nondisplaced fracture of lateral malleolus of left fibula, initial encounter   Started by:  Jazmin Alfaro MD        Dose:  800 mg   Take 1 tablet (800 mg) by mouth every 8 hours as needed for moderate pain   Quantity:  60 tablet   Refills:  1       traMADol 50 MG tablet   Commonly known as:  ULTRAM   Used for:  Closed nondisplaced fracture of lateral malleolus of left fibula, initial encounter   Started by:  Jazmin Alfaro MD        Dose:  50 mg   Take 1 tablet (50 mg) by mouth every 12 hours as needed for moderate pain   Quantity:  40 tablet   Refills:  2            Where to get your medicines      These medications were sent to [facility] - [place] [address] - [address]  [address] [address]     Phone:  [phone]     ibuprofen 800 MG tablet         Some of these will need a paper prescription and others can be bought over the counter.  Ask your nurse if you have questions.     Bring a paper prescription for each of these medications     traMADol 50 MG tablet                Primary Care Provider " Office Phone # Fax #    Venessa Chavez PA-C [phone] [address] [place] [address]        Equal Access to Services     SHRUTHI AWAN : Hadii aad ku hadnataliyared Yesiali, waahsanda luqdesirae, qahayleyta kaalisada jack, adele medrano jerichowhitley espinoza laJackelinejennifer alexander. So [facility] [phone].    ATENCIÓN: Si habla español, tiene a cervantes disposición servicios gratuitos de asistencia lingüística. Llame al [phone].    We comply with applicable federal civil rights laws and [place] laws. We do not discriminate on the basis of race, color, national origin, age, disability, sex, sexual orientation, or gender identity.            Thank you!     Thank you for choosing [facility]  for your care. Our goal is always to provide you with excellent care. Hearing back from our patients is one way we can continue to improve our services. Please take a few minutes to complete the written survey that you may receive in the mail after your visit with us. Thank you!             Your Updated Medication List - Protect others around you: Learn how to safely use, store and throw away your medicines at www.disposemymeds.org.          This list is accurate as of: 11/1/17  4:06 PM.  Always use your most recent med list.                   Brand Name Dispense Instructions for use Diagnosis    CALCIUM + D PO      2 TABLET ONCE A DAY    Labyrinthitis       chromium 200 MCG Caps capsule      Take 200 mcg by mouth daily.        cyclobenzaprine 10 MG tablet    FLEXERIL    14 tablet    Take 1 tablet (10 mg) by mouth nightly as needed for muscle spasms    Acute right-sided low back pain with right-sided sciatica       GLUCOSAMINE CHONDRO COMPLEX OR      TAKE TWO CAPSULES DAILY.        ibuprofen 800 MG tablet    ADVIL/MOTRIN    60 tablet    Take 1 tablet (800 mg) by mouth every 8 hours as needed for moderate pain    Closed nondisplaced fracture of lateral malleolus of left fibula, initial encounter       MULTIVITAMIN  PO      TAKE ONE TABLET DAILY.        OMEGA 3 PO      TAKE TWO CAPSULES DAILY.        order for DME     1 Device    Knee scooter will need for at least 2 months    Closed nondisplaced fracture of lateral malleolus of left fibula, initial encounter       traMADol 50 MG tablet    ULTRAM    40 tablet    Take 1 tablet (50 mg) by mouth every 12 hours as needed for moderate pain    Closed nondisplaced fracture of lateral malleolus of left fibula, initial encounter       ZYRTEC ALLERGY 10 MG tablet   Generic drug:  cetirizine      Take 10 mg by mouth daily

## 2017-11-01 NOTE — LETTER
"    11/1/2017         RE: Rach Dick  [id number] [facility] [address]        Dear Colleague,    Thank you for referring your patient, Rach Dick, to the [facility]. Please see a copy of my visit note below.    Office Visit-Follow up    Chief Complaint: Rach Dick is a 62 year old female who is being seen for   Chief Complaint   Patient presents with     Fracture Followup     left ankle pr feels he pain is getting worse        History of Present Illness:   Pt has a scooter which helps  Some pain medial ankle after a fall  Pain 3/10      REVIEW OF SYSTEMS  General: negative for, night sweats, dizziness, fatigue  Resp: No shortness of breath and no cough  CV: negative for chest pain, syncope or near-syncope  GI: negative for nausea, vomiting and diarrhea  : negative for dysuria and hematuria  Musculoskeletal: as above  Neurologic: negative for syncope   Hematologic: negative for bleeding disorder    Physical Exam:  Vitals: Temp 96  F (35.6  C)  Ht 1.549 m (5' 1\")  Wt 57.2 kg (126 lb)  BMI 23.81 kg/m2  BMI= Body mass index is 23.81 kg/(m^2).  Constitutional: healthy, alert and no acute distress   Psychiatric: mentation appears normal and affect normal/bright  NEURO: no focal deficits  RESP: Normal with easy respirations and no use of accessory muscles to breathe, no audible wheezing or retractions  CV: No peripheral edema  SKIN: No erythema, rashes, excoriation, or breakdown. No evidence of infection.   JOINT/EXTREMITIES:left ankle - splint removed and SLC placed, skin intact  GAIT: with assistive device            Diagnostic Modalities:  left ankle X-ray: bimalleolar fracture  Good alignment post cast  Independent visualization of the images was performed.      Impression: left Ankle fracture- bi malleolus nonop 12 days    Plan:  All of the above pertinent physical exam and imaging modalities findings was reviewed with Rach.                                          " CONSERVATIVE CARE:  I recommend conservative care for the patient to include NSAIDs, Tramadol, activity modifications, rest, cast. Today I provided or dispensed Ibuprofen prescription, Tramadol prescription.                                        NSAIDS RISKS:  I have prescribed an antiinflammatory medication.  We discussed that it is the same class of some the common over the counter medications (Ibuprofen, Advil, Motrin, Aleve, Naproxen, and  Naprosyn). I recommend to avoid taking these OTC's medication when taking the medication that I prescribed. This medication should be stopped if having stomach issues, bleeding, high blood pressure and/or chest pain                                          CAST/SPLINT APPLICATION:  On today's visit a well padded Fiberglass  short leg cast was applied to the left lower extremity . The neurovascular status is unchanged after application. Cast/splint care was discussed.    Return to clinic 2, week(s), or sooner as needed for changes.  Re-x-ray on return: Yes, out of cast first.     Jazmin Alfaro M.D.              Again, thank you for allowing me to participate in the care of your patient.        Sincerely,        Jazmin Alfaro MD

## 2017-11-01 NOTE — PROGRESS NOTES
"Office Visit-Follow up    Chief Complaint: Rach Dick is a 62 year old female who is being seen for   Chief Complaint   Patient presents with     Fracture Followup     left ankle pr feels he pain is getting worse        History of Present Illness:   Pt has a scooter which helps  Some pain medial ankle after a fall  Pain 3/10      REVIEW OF SYSTEMS  General: negative for, night sweats, dizziness, fatigue  Resp: No shortness of breath and no cough  CV: negative for chest pain, syncope or near-syncope  GI: negative for nausea, vomiting and diarrhea  : negative for dysuria and hematuria  Musculoskeletal: as above  Neurologic: negative for syncope   Hematologic: negative for bleeding disorder    Physical Exam:  Vitals: Temp 96  F (35.6  C)  Ht 1.549 m (5' 1\")  Wt 57.2 kg (126 lb)  BMI 23.81 kg/m2  BMI= Body mass index is 23.81 kg/(m^2).  Constitutional: healthy, alert and no acute distress   Psychiatric: mentation appears normal and affect normal/bright  NEURO: no focal deficits  RESP: Normal with easy respirations and no use of accessory muscles to breathe, no audible wheezing or retractions  CV: No peripheral edema  SKIN: No erythema, rashes, excoriation, or breakdown. No evidence of infection.   JOINT/EXTREMITIES:left ankle - splint removed and SLC placed, skin intact  GAIT: with assistive device            Diagnostic Modalities:  left ankle X-ray: bimalleolar fracture  Good alignment post cast  Independent visualization of the images was performed.      Impression: left Ankle fracture- bi malleolus nonop 12 days    Plan:  All of the above pertinent physical exam and imaging modalities findings was reviewed with Rach.                                          CONSERVATIVE CARE:  I recommend conservative care for the patient to include NSAIDs, Tramadol, activity modifications, rest, cast. Today I provided or dispensed Ibuprofen prescription, Tramadol prescription.                                        " NSAIDS RISKS:  I have prescribed an antiinflammatory medication.  We discussed that it is the same class of some the common over the counter medications (Ibuprofen, Advil, Motrin, Aleve, Naproxen, and  Naprosyn). I recommend to avoid taking these OTC's medication when taking the medication that I prescribed. This medication should be stopped if having stomach issues, bleeding, high blood pressure and/or chest pain                                          CAST/SPLINT APPLICATION:  On today's visit a well padded Fiberglass  short leg cast was applied to the left lower extremity . The neurovascular status is unchanged after application. Cast/splint care was discussed.    Return to clinic 2, week(s), or sooner as needed for changes.  Re-x-ray on return: Yes, out of cast first.     Jazmin Alfaro M.D.

## 2017-11-01 NOTE — NURSING NOTE
"Chief Complaint   Patient presents with     Fracture Followup     left ankle pr feels he pain is getting worse        Initial Temp 96  F (35.6  C)  Ht 1.549 m (5' 1\")  Wt 57.2 kg (126 lb)  BMI 23.81 kg/m2 Estimated body mass index is 23.81 kg/(m^2) as calculated from the following:    Height as of this encounter: 1.549 m (5' 1\").    Weight as of this encounter: 57.2 kg (126 lb).  Medication Reconciliation: complete    BP completed using cuff size: NA (Not Taken)    Yenny Cordon MA      "

## 2017-11-15 ENCOUNTER — RADIANT APPOINTMENT (OUTPATIENT)
Dept: GENERAL RADIOLOGY | Facility: CLINIC | Age: 62
End: 2017-11-15
Attending: ORTHOPAEDIC SURGERY
Payer: COMMERCIAL

## 2017-11-15 ENCOUNTER — OFFICE VISIT (OUTPATIENT)
Dept: ORTHOPEDICS | Facility: CLINIC | Age: 62
End: 2017-11-15
Payer: COMMERCIAL

## 2017-11-15 VITALS — BODY MASS INDEX: 23.79 KG/M2 | HEIGHT: 61 IN | TEMPERATURE: 97.3 F | WEIGHT: 126 LBS

## 2017-11-15 DIAGNOSIS — S82.65XA CLOSED NONDISPLACED FRACTURE OF LATERAL MALLEOLUS OF LEFT FIBULA, INITIAL ENCOUNTER: Primary | ICD-10-CM

## 2017-11-15 PROCEDURE — 99207 ZZC FRACTURE CARE IN GLOBAL PERIOD: CPT | Performed by: ORTHOPAEDIC SURGERY

## 2017-11-15 PROCEDURE — 73610 X-RAY EXAM OF ANKLE: CPT | Mod: TC

## 2017-11-15 ASSESSMENT — PAIN SCALES - GENERAL: PAINLEVEL: NO PAIN (0)

## 2017-11-15 NOTE — PATIENT INSTRUCTIONS
Ankle Alphabet (Flexibility)    These instructions are for your right foot. Switch sides for your left foot.  1. Sit on the floor with your legs straight in front of you.  2. Rest your right calf on a rolled-up towel. Use your foot to write the letters of the alphabet in mid-air.  3. Repeat this exercise 3 times a day, or as instructed.  Date Last Reviewed: 3/10/2016    3142-2180 [employer]. [address] [place] [address]. All rights reserved. This information is not intended as a substitute for professional medical care. Always follow your healthcare professional's instructions.        Foot and Ankle Exercises: Ankle Circles    This exercise is designed to stretch and strengthen your feet and ankles. Before beginning the exercise, read through all the instructions. While exercising, breathe normally. If you feel any pain, stop the exercise. If pain persists, inform your healthcare provider.    Sit straight-legged on the floor or other firm surface.    Resting your ______ calf on a rolled-up towel, use your foot to draw circles in both directions or write the letters of the alphabet in the air.    Continue for ______ seconds. Do ______ times a day.  Date Last Reviewed: 9/9/2015 2000-2017 [employer]. [address] [place] [address]. All rights reserved. This information is not intended as a substitute for professional medical care. Always follow your healthcare professional's instructions.

## 2017-11-15 NOTE — MR AVS SNAPSHOT
After Visit Summary   11/15/2017    Rach Dick    MRN: [id number]           Patient Information     Date Of Birth          1955        Visit Information        Provider Department      11/15/2017 1:40 PM Jazmin Alfaro MD [facility] Instructions      Ankle Alphabet (Flexibility)    These instructions are for your right foot. Switch sides for your left foot.  1. Sit on the floor with your legs straight in front of you.  2. Rest your right calf on a rolled-up towel. Use your foot to write the letters of the alphabet in mid-air.  3. Repeat this exercise 3 times a day, or as instructed.  Date Last Reviewed: 3/10/2016    8171-1431 [employer]. [address] [place] [address]. All rights reserved. This information is not intended as a substitute for professional medical care. Always follow your healthcare professional's instructions.        Foot and Ankle Exercises: Ankle Circles    This exercise is designed to stretch and strengthen your feet and ankles. Before beginning the exercise, read through all the instructions. While exercising, breathe normally. If you feel any pain, stop the exercise. If pain persists, inform your healthcare provider.    Sit straight-legged on the floor or other firm surface.    Resting your ______ calf on a rolled-up towel, use your foot to draw circles in both directions or write the letters of the alphabet in the air.    Continue for ______ seconds. Do ______ times a day.  Date Last Reviewed: 9/9/2015 2000-2017 [employer]. [address] [place] [address]. All rights reserved. This information is not intended as a substitute for professional medical care. Always follow your healthcare professional's instructions.                Follow-ups after your visit        Who to contact     If you have questions or need follow up information about today's clinic visit or your schedule please  "contact [facility] directly at [phone].  Normal or non-critical lab and imaging results will be communicated to you by MyChart, letter or phone within 4 business days after the clinic has received the results. If you do not hear from us within 7 days, please contact the clinic through MyChart or phone. If you have a critical or abnormal lab result, we will notify you by phone as soon as possible.  Submit refill requests through [employer] or call your pharmacy and they will forward the refill request to us. Please allow 3 business days for your refill to be completed.          Additional Information About Your Visit        Live [employer] Information     [employer] gives you secure access to your electronic health record. If you see a primary care provider, you can also send messages to your care team and make appointments. If you have questions, please call your primary care clinic.  If you do not have a primary care provider, please call [phone] and they will assist you.        Care EveryWhere ID     This is your Care EveryWhere ID. This could be used by other organizations to access your [place] medical records  [id number]        Your Vitals Were     Temperature Height BMI (Body Mass Index)             97.3  F (36.3  C) 1.549 m (5' 1\") 23.81 kg/m2          Blood Pressure from Last 3 Encounters:   09/07/17 102/62   06/29/16 118/80   06/08/16 110/60    Weight from Last 3 Encounters:   11/15/17 57.2 kg (126 lb)   11/01/17 57.2 kg (126 lb)   10/23/17 57.2 kg (126 lb)              Today, you had the following     No orders found for display       Primary Care Provider Office Phone # Fax #    Venessa Chavez PA-C [phone] [address] [place] [address]        Equal Access to Services     SHRUTHI AWAN : Godfrey Tracy, waisaura calles, qaybta kaalkimber santiago, adele alexander. So [facility] [phone].    ATENCIÓN: Si ze blue, " tiene a cervantes disposición servicios gratuitos de asistencia lingüística. [employer] [phone].    We comply with applicable federal civil rights laws and [place] laws. We do not discriminate on the basis of race, color, national origin, age, disability, sex, sexual orientation, or gender identity.            Thank you!     Thank you for choosing [facility]  for your care. Our goal is always to provide you with excellent care. Hearing back from our patients is one way we can continue to improve our services. Please take a few minutes to complete the written survey that you may receive in the mail after your visit with us. Thank you!             Your Updated Medication List - Protect others around you: Learn how to safely use, store and throw away your medicines at www.disposemymeds.org.          This list is accurate as of: 11/15/17  2:10 PM.  Always use your most recent med list.                   Brand Name Dispense Instructions for use Diagnosis    CALCIUM + D PO      2 TABLET ONCE A DAY    Labyrinthitis       chromium 200 MCG Caps capsule      Take 200 mcg by mouth daily.        cyclobenzaprine 10 MG tablet    FLEXERIL    14 tablet    Take 1 tablet (10 mg) by mouth nightly as needed for muscle spasms    Acute right-sided low back pain with right-sided sciatica       GLUCOSAMINE CHONDRO COMPLEX OR      TAKE TWO CAPSULES DAILY.        ibuprofen 800 MG tablet    ADVIL/MOTRIN    60 tablet    Take 1 tablet (800 mg) by mouth every 8 hours as needed for moderate pain    Closed nondisplaced fracture of lateral malleolus of left fibula, initial encounter       MULTIVITAMIN PO      TAKE ONE TABLET DAILY.        OMEGA 3 PO      TAKE TWO CAPSULES DAILY.        order for DME     1 Device    Knee scooter will need for at least 2 months    Closed nondisplaced fracture of lateral malleolus of left fibula, initial encounter       traMADol 50 MG tablet    ULTRAM    40 tablet    Take 1 tablet (50 mg) by mouth every  12 hours as needed for moderate pain    Closed nondisplaced fracture of lateral malleolus of left fibula, initial encounter       ZYRTEC ALLERGY 10 MG tablet   Generic drug:  cetirizine      Take 10 mg by mouth daily

## 2017-11-15 NOTE — NURSING NOTE
"Chief Complaint   Patient presents with     RECHECK     left ankle f/u doi 10/20/17       Initial Temp 97.3  F (36.3  C)  Ht 1.549 m (5' 1\")  Wt 57.2 kg (126 lb)  BMI 23.81 kg/m2 Estimated body mass index is 23.81 kg/(m^2) as calculated from the following:    Height as of this encounter: 1.549 m (5' 1\").    Weight as of this encounter: 57.2 kg (126 lb).  Medication Reconciliation: complete    BP completed using cuff size: NA (Not Taken)    Yenny Cordon MA      "

## 2017-11-15 NOTE — PROGRESS NOTES
"Office Visit-Follow up    Chief Complaint: Rach Dick is a 62 year old female who is being seen for   Chief Complaint   Patient presents with     RECHECK     left ankle f/u doi 10/20/17       History of Present Illness:   Feels stiff out of cast      REVIEW OF SYSTEMS  General: negative for, night sweats, dizziness, fatigue  Resp: No shortness of breath and no cough  CV: negative for chest pain, syncope or near-syncope  GI: negative for nausea, vomiting and diarrhea  : negative for dysuria and hematuria  Musculoskeletal: as above  Neurologic: negative for syncope   Hematologic: negative for bleeding disorder    Physical Exam:  Vitals: Temp 97.3  F (36.3  C)  Ht 1.549 m (5' 1\")  Wt 57.2 kg (126 lb)  BMI 23.81 kg/m2  BMI= Body mass index is 23.81 kg/(m^2).  Constitutional: healthy, alert and no acute distress   Psychiatric: mentation appears normal and affect normal/bright  NEURO: no focal deficits  RESP: Normal with easy respirations and no use of accessory muscles to breathe, no audible wheezing or retractions  CV: No peripheral edema  SKIN: No erythema, rashes, excoriation, or breakdown. No evidence of infection.   JOINT/EXTREMITIES:left Ankle Exam:   Inspection:Swelling:lateral , medial , mild, alignment good  Tender:lateral malleolus, medial malleolus, mild  Range of Motion:very stiff just out of cast    GAIT: with assistive device            Diagnostic Modalities:  left ankle X-ray: bimalleolar fracture healing, mortise intact  Independent visualization of the images was performed.      Impression: left Ankle fracture- bi malleolus   26 days out nonop tx    Plan:  All of the above pertinent physical exam and imaging modalities findings was reviewed with Rach.                                          CONSERVATIVE CARE:  I recommend conservative care for the patient to include NSAIDs, Tylenol, focused self directed physical therapy, wbat in boot with crutches. Today I provided or dispensed crutches, " gene, hep.                                                FUTURE PLAN:  On their return if they still have symptoms we will consider physical therapy, NSAID's.        Return to clinic 1, month(s), or sooner as needed for changes.  Re-x-ray on return: Yes.    Jazmin Alfaro M.D.

## 2017-11-15 NOTE — LETTER
"    11/15/2017         RE: Rach Dick  [address] [facility] [address]        Dear Colleague,    Thank you for referring your patient, Rach Dick, to the [facility]. Please see a copy of my visit note below.    Office Visit-Follow up    Chief Complaint: Rach Dick is a 62 year old female who is being seen for   Chief Complaint   Patient presents with     RECHECK     left ankle f/u doi 10/20/17       History of Present Illness:   Feels stiff out of cast      REVIEW OF SYSTEMS  General: negative for, night sweats, dizziness, fatigue  Resp: No shortness of breath and no cough  CV: negative for chest pain, syncope or near-syncope  GI: negative for nausea, vomiting and diarrhea  : negative for dysuria and hematuria  Musculoskeletal: as above  Neurologic: negative for syncope   Hematologic: negative for bleeding disorder    Physical Exam:  Vitals: Temp 97.3  F (36.3  C)  Ht 1.549 m (5' 1\")  Wt 57.2 kg (126 lb)  BMI 23.81 kg/m2  BMI= Body mass index is 23.81 kg/(m^2).  Constitutional: healthy, alert and no acute distress   Psychiatric: mentation appears normal and affect normal/bright  NEURO: no focal deficits  RESP: Normal with easy respirations and no use of accessory muscles to breathe, no audible wheezing or retractions  CV: No peripheral edema  SKIN: No erythema, rashes, excoriation, or breakdown. No evidence of infection.   JOINT/EXTREMITIES:left Ankle Exam:   Inspection:Swelling:lateral , medial , mild, alignment good  Tender:lateral malleolus, medial malleolus, mild  Range of Motion:very stiff just out of cast    GAIT: with assistive device            Diagnostic Modalities:  left ankle X-ray: bimalleolar fracture healing, mortise intact  Independent visualization of the images was performed.      Impression: left Ankle fracture- bi malleolus   26 days out nonop tx    Plan:  All of the above pertinent physical exam and imaging modalities findings was reviewed with " Rach.                                          CONSERVATIVE CARE:  I recommend conservative care for the patient to include NSAIDs, Tylenol, focused self directed physical therapy, wbat in boot with crutches. Today I provided or dispensed crutches, boot, hep.                                                FUTURE PLAN:  On their return if they still have symptoms we will consider physical therapy, NSAID's.        Return to clinic 1, month(s), or sooner as needed for changes.  Re-x-ray on return: Yes.    Jazmin Alfaro M.D.        Again, thank you for allowing me to participate in the care of your patient.        Sincerely,        Jazmin Alfaro MD

## 2017-12-01 ENCOUNTER — ALLIED HEALTH/NURSE VISIT (OUTPATIENT)
Dept: FAMILY MEDICINE | Facility: CLINIC | Age: 62
End: 2017-12-01
Payer: COMMERCIAL

## 2017-12-01 VITALS — HEART RATE: 95 BPM | OXYGEN SATURATION: 98 %

## 2017-12-01 DIAGNOSIS — S82.65XA CLOSED NONDISPLACED FRACTURE OF LATERAL MALLEOLUS OF LEFT FIBULA: Primary | ICD-10-CM

## 2017-12-01 PROCEDURE — 99207 ZZC NO CHARGE NURSE ONLY: CPT

## 2017-12-01 NOTE — NURSING NOTE
Patient seen as a walk-in today. Reports swelling and redness to medial ankle bone of left leg. Is in a walking boot and is allowed to WBAT. 1.5 months out from Closed nondisplaced fracture of lateral malleolus of left fibula. Writer removed boot, assessed skin. Noted a dime size of redness to right ankle bone. Blanchable. Inspected walking boot. Chambers not filled much. Filled equally. To prevent breakdown of skin, writer suggested patient to fill up right chamber more than the left to disperse pressure. If this does not help, patient was provided a 4x4 mepilex dressing to place over the red area. This can be worn for days and can be pealed back and reapplied. She can get a similar dressing at any drug store. Of note: reports numbness/tingling when touching top of her foot. Admits to not sleeping in boot at night since she cannot sleep with it on. She uses an ACE bandage instead. She will try to alleviate the pressure on her ankle bone, then apply the boot at night again.  Bobbi Hernandez RN  [facility]  12/1/2017 10:18 AM

## 2017-12-01 NOTE — MR AVS SNAPSHOT
After Visit Summary   12/1/2017    Rach Dick    MRN: [id number]           Patient Information     Date Of Birth          1955        Visit Information        Provider Department      12/1/2017 10:15 AM  SPECIALTY RN [facility]        Today's Diagnoses     Closed nondisplaced fracture of lateral malleolus of left fibula    -  1       Follow-ups after your visit        Your next 10 appointments already scheduled     Dec 13, 2017  1:20 PM CST   Return Visit with Jamzin Alfaro MD   [facility]    [address] [place] [id number] [phone]              Who to contact     If you have questions or need follow up information about today's clinic visit or your schedule please contact [facility] directly at [phone].  Normal or non-critical lab and imaging results will be communicated to you by [employer], letter or phone within 4 business days after the clinic has received the results. If you do not hear from us within 7 days, please contact the clinic through Igglihart or phone. If you have a critical or abnormal lab result, we will notify you by phone as soon as possible.  Submit refill requests through [employer] or call your pharmacy and they will forward the refill request to us. Please allow 3 business days for your refill to be completed.          Additional Information About Your Visit        MyChart Information     [employer] gives you secure access to your electronic health record. If you see a primary care provider, you can also send messages to your care team and make appointments. If you have questions, please call your primary care clinic.  If you do not have a primary care provider, please call [phone] and they will assist you.        Care EveryWhere ID     This is your Care EveryWhere ID. This could be used by other organizations to access your [place] medical records  [id number]         Your Vitals Were     Pulse Pulse Oximetry                95 98%           Blood Pressure from Last 3 Encounters:   09/07/17 102/62   06/29/16 118/80   06/08/16 110/60    Weight from Last 3 Encounters:   11/15/17 126 lb (57.2 kg)   11/01/17 126 lb (57.2 kg)   10/23/17 126 lb (57.2 kg)              Today, you had the following     No orders found for display       Primary Care Provider Office Phone # Fax #    Venessa Chavez PA-C [phone] [address] [place] [address]        Equal Access to Services     [facility]: Hadii aad ku hadasho Soomaali, waaxda luqadaha, qaybta kaalmada adeegyada, adele hilliard . So [facility] [phone].    ATENCIÓN: Si habla español, tiene a cervantes disposición servicios gratuitos de asistencia lingüística. [facility] [phone].    We comply with applicable federal civil rights laws and [place] laws. We do not discriminate on the basis of race, color, national origin, age, disability, sex, sexual orientation, or gender identity.            Thank you!     Thank you for choosing [facility]  for your care. Our goal is always to provide you with excellent care. Hearing back from our patients is one way we can continue to improve our services. Please take a few minutes to complete the written survey that you may receive in the mail after your visit with us. Thank you!             Your Updated Medication List - Protect others around you: Learn how to safely use, store and throw away your medicines at www.disposemymeds.org.          This list is accurate as of: 12/1/17 10:21 AM.  Always use your most recent med list.                   Brand Name Dispense Instructions for use Diagnosis    CALCIUM + D PO      2 TABLET ONCE A DAY    Labyrinthitis       chromium 200 MCG Caps capsule      Take 200 mcg by mouth daily.        cyclobenzaprine 10 MG tablet    FLEXERIL    14 tablet    Take 1 tablet (10 mg) by mouth nightly as  needed for muscle spasms    Acute right-sided low back pain with right-sided sciatica       GLUCOSAMINE CHONDRO COMPLEX OR      TAKE TWO CAPSULES DAILY.        ibuprofen 800 MG tablet    ADVIL/MOTRIN    60 tablet    Take 1 tablet (800 mg) by mouth every 8 hours as needed for moderate pain    Closed nondisplaced fracture of lateral malleolus of left fibula, initial encounter       MULTIVITAMIN PO      TAKE ONE TABLET DAILY.        OMEGA 3 PO      TAKE TWO CAPSULES DAILY.        order for DME     1 Device    Knee scooter will need for at least 2 months    Closed nondisplaced fracture of lateral malleolus of left fibula, initial encounter       traMADol 50 MG tablet    ULTRAM    40 tablet    Take 1 tablet (50 mg) by mouth every 12 hours as needed for moderate pain    Closed nondisplaced fracture of lateral malleolus of left fibula, initial encounter       ZYRTEC ALLERGY 10 MG tablet   Generic drug:  cetirizine      Take 10 mg by mouth daily

## 2017-12-13 ENCOUNTER — RADIANT APPOINTMENT (OUTPATIENT)
Dept: GENERAL RADIOLOGY | Facility: CLINIC | Age: 62
End: 2017-12-13
Attending: ORTHOPAEDIC SURGERY
Payer: COMMERCIAL

## 2017-12-13 ENCOUNTER — OFFICE VISIT (OUTPATIENT)
Dept: ORTHOPEDICS | Facility: CLINIC | Age: 62
End: 2017-12-13
Payer: COMMERCIAL

## 2017-12-13 VITALS — WEIGHT: 126 LBS | TEMPERATURE: 96.9 F | BODY MASS INDEX: 23.79 KG/M2 | HEIGHT: 61 IN

## 2017-12-13 DIAGNOSIS — S82.65XA CLOSED NONDISPLACED FRACTURE OF LATERAL MALLEOLUS OF LEFT FIBULA: ICD-10-CM

## 2017-12-13 DIAGNOSIS — S82.65XD CLOSED NONDISPLACED FRACTURE OF LATERAL MALLEOLUS OF LEFT FIBULA WITH ROUTINE HEALING, SUBSEQUENT ENCOUNTER: Primary | ICD-10-CM

## 2017-12-13 PROCEDURE — 99207 ZZC FRACTURE CARE IN GLOBAL PERIOD: CPT | Performed by: ORTHOPAEDIC SURGERY

## 2017-12-13 PROCEDURE — 73610 X-RAY EXAM OF ANKLE: CPT | Mod: TC

## 2017-12-13 NOTE — LETTER
[address] [address]  Phone: [phone]  Fax: [phone]    December 13, 2017        Rach Dick  [address] [address] [place] [id number]          To whom it may concern:    RE: Rach Dick    Patient was seen and treated today at our clinic.  Please excuse her from work for 6 weeks.     Please contact me for questions or concerns.      Sincerely,        Jazmin Alfaro MD

## 2017-12-13 NOTE — PROGRESS NOTES
"Office Visit-Follow up    Chief Complaint: Rach Dick is a 62 year old female who is being seen for   Chief Complaint   Patient presents with     Fracture Followup     left ankle f/u doi 10/20/17       History of Present Illness:   Has been in boot but is tired of it, using crutches outside, no crutches inside, ace wrap at night    Doing HEP    REVIEW OF SYSTEMS  General: negative for, night sweats, dizziness, fatigue  Resp: No shortness of breath and no cough  CV: negative for chest pain, syncope or near-syncope  GI: negative for nausea, vomiting and diarrhea  : negative for dysuria and hematuria  Musculoskeletal: as above  Neurologic: negative for syncope   Hematologic: negative for bleeding disorder    Physical Exam:  Vitals: Temp 96.9  F (36.1  C)  Ht 1.549 m (5' 1\")  Wt 57.2 kg (126 lb)  BMI 23.81 kg/m2  BMI= Body mass index is 23.81 kg/(m^2).  Constitutional: healthy, alert and no acute distress   Psychiatric: mentation appears normal and affect normal/bright  NEURO: no focal deficits  RESP: Normal with easy respirations and no use of accessory muscles to breathe, no audible wheezing or retractions  CV: No peripheral edema  SKIN: No erythema, rashes, excoriation, or breakdown. No evidence of infection.   JOINT/EXTREMITIES:left Ankle Exam:   ANKLE  Inspection:Swelling:none   Tender:ankle joint line  Non-tender:lateral malleolus, medial malleolus  Range of Motion:stiff        GAIT: with assistive device            Diagnostic Modalities:  left ankle X-ray: bimalleolar fracture  Normal mortise  Independent visualization of the images was performed.      Impression: left Ankle fracture- bi malleolus   nonop tx 2 mos doing fine    Plan:  All of the above pertinent physical exam and imaging modalities findings was reviewed with Rach.                                          CONSERVATIVE CARE:  I recommend conservative care for the patient to include NSAIDs, Tylenol, focused self directed physical " therapy, activity modifications, d/c boot. Today I provided or dispensed HEP, work note.                                                FUTURE PLAN:  On their return if they still have symptoms we will consider physical therapy, NSAID's.        Return to clinic 4-6 , week(s), or sooner as needed for changes.  Re-x-ray on return: Yes.    Jazmin Alfaro M.D.

## 2017-12-13 NOTE — MR AVS SNAPSHOT
After Visit Summary   12/13/2017    Rach Dick    MRN: [id number]           Patient Information     Date Of Birth          1955        Visit Information        Provider Department      12/13/2017 1:20 PM Jazmin Alfaro MD [facility]        Today's Diagnoses     Closed nondisplaced fracture of lateral malleolus of left fibula    -  1      Care Instructions      Ankle Alphabet (Flexibility)    These instructions are for your right foot. Switch sides for your left foot.  1. Sit on the floor with your legs straight in front of you.  2. Rest your right calf on a rolled-up towel. Use your foot to write the letters of the alphabet in mid-air.  3. Repeat this exercise 3 times a day, or as instructed.  Date Last Reviewed: 3/10/2016    4395-3349 [facility]. [address] [place] [address]. All rights reserved. This information is not intended as a substitute for professional medical care. Always follow your healthcare professional's instructions.        Foot and Ankle Exercises: Ankle Circles    This exercise is designed to stretch and strengthen your feet and ankles. Before beginning the exercise, read through all the instructions. While exercising, breathe normally. If you feel any pain, stop the exercise. If pain persists, inform your healthcare provider.    Sit straight-legged on the floor or other firm surface.    Resting your ______ calf on a rolled-up towel, use your foot to draw circles in both directions or write the letters of the alphabet in the air.    Continue for ______ seconds. Do ______ times a day.  Date Last Reviewed: 9/9/2015 2000-2017 [facility]. [address] [place] [address]. All rights reserved. This information is not intended as a substitute for professional medical care. Always follow your healthcare professional's instructions.        Bent-Knee Calf Stretch    This exercise is designed to stretch and  strengthen your feet and ankles. Before beginning the exercise, read through all the instructions. While exercising, breathe normally and don t bounce. If you feel any pain, stop the exercise. If pain persists, inform your healthcare provider:    Stand an arm s length away from a wall. Place the palms of your hands on the wall. Step forward about 12 inches with your ______ foot.    Keeping toes pointed forward and both heels on the floor, bend both knees and lean forward. Hold for ______ seconds. Relax.    Repeat ______ times. Do ______ sets a day.  Date Last Reviewed: 8/16/2015 2000-2017 [employer]. [address] [place] [address]. All rights reserved. This information is not intended as a substitute for professional medical care. Always follow your healthcare professional's instructions.        Calf Raise (Strength)    4. Stand up straight with both feet flat on the floor, slightly apart. Hold onto a sturdy chair, railing, counter, or table.  5. Raise both heels so you re standing on the balls of your feet. Don t lock your knees or arch your back. Hold for 5 seconds. Then slowly lower your heels back down to the floor.  6. Repeat 10 times, or as instructed.  7. Do this exercise 3 times a day, or as instructed.     Challenge yourself  As you become stronger, do this exercise on one foot at a time.   Date Last Reviewed: 3/10/2016    7209-4734 [employer]. [address] [place] [address]. All rights reserved. This information is not intended as a substitute for professional medical care. Always follow your healthcare professional's instructions.                Follow-ups after your visit        Who to contact     If you have questions or need follow up information about today's clinic visit or your schedule please contact [facility] directly at [phone].  Normal or non-critical lab and imaging results will be communicated to you by Candice  "letter or phone within 4 business days after the clinic has received the results. If you do not hear from us within 7 days, please contact the clinic through [employer] or phone. If you have a critical or abnormal lab result, we will notify you by phone as soon as possible.  Submit refill requests through [employer] or call your pharmacy and they will forward the refill request to us. Please allow 3 business days for your refill to be completed.          Additional Information About Your Visit        meQuilibriumharN-Sided Information     [employer] gives you secure access to your electronic health record. If you see a primary care provider, you can also send messages to your care team and make appointments. If you have questions, please call your primary care clinic.  If you do not have a primary care provider, please call [phone] and they will assist you.        Care EveryWhere ID     This is your Care EveryWhere ID. This could be used by other organizations to access your [facility] medical records  [id number]        Your Vitals Were     Temperature Height BMI (Body Mass Index)             96.9  F (36.1  C) 1.549 m (5' 1\") 23.81 kg/m2          Blood Pressure from Last 3 Encounters:   09/07/17 102/62   06/29/16 118/80   06/08/16 110/60    Weight from Last 3 Encounters:   12/13/17 57.2 kg (126 lb)   11/15/17 57.2 kg (126 lb)   11/01/17 57.2 kg (126 lb)               Primary Care Provider Office Phone # Fax #    Venessa Chavez PA-C [phone]       [address] [place] [address]        Equal Access to Services     [facility]: Hadii aad ku hadasho Soomaali, waaxda luqadaha, qaybta kaalmada adeegyajd, adele hilliard . So [facility] [phone].    ATENCIÓN: Si habla español, tiene a cervantes disposición servicios gratuitos de asistencia lingüística. Llame al [phone].    We comply with applicable federal civil rights laws and [place] laws. We do not discriminate on the basis of race, " color, national origin, age, disability, sex, sexual orientation, or gender identity.            Thank you!     Thank you for choosing [facility]  for your care. Our goal is always to provide you with excellent care. Hearing back from our patients is one way we can continue to improve our services. Please take a few minutes to complete the written survey that you may receive in the mail after your visit with us. Thank you!             Your Updated Medication List - Protect others around you: Learn how to safely use, store and throw away your medicines at www.disposemymeds.org.          This list is accurate as of: 12/13/17  1:48 PM.  Always use your most recent med list.                   Brand Name Dispense Instructions for use Diagnosis    CALCIUM + D PO      2 TABLET ONCE A DAY    Labyrinthitis       chromium 200 MCG Caps capsule      Take 200 mcg by mouth daily.        cyclobenzaprine 10 MG tablet    FLEXERIL    14 tablet    Take 1 tablet (10 mg) by mouth nightly as needed for muscle spasms    Acute right-sided low back pain with right-sided sciatica       GLUCOSAMINE CHONDRO COMPLEX OR      TAKE TWO CAPSULES DAILY.        ibuprofen 800 MG tablet    ADVIL/MOTRIN    60 tablet    Take 1 tablet (800 mg) by mouth every 8 hours as needed for moderate pain    Closed nondisplaced fracture of lateral malleolus of left fibula, initial encounter       MULTIVITAMIN PO      TAKE ONE TABLET DAILY.        OMEGA 3 PO      TAKE TWO CAPSULES DAILY.        order for DME     1 Device    Knee scooter will need for at least 2 months    Closed nondisplaced fracture of lateral malleolus of left fibula, initial encounter       traMADol 50 MG tablet    ULTRAM    40 tablet    Take 1 tablet (50 mg) by mouth every 12 hours as needed for moderate pain    Closed nondisplaced fracture of lateral malleolus of left fibula, initial encounter       ZYRTEC ALLERGY 10 MG tablet   Generic drug:  cetirizine      Take 10 mg by mouth  daily

## 2017-12-13 NOTE — LETTER
"    12/13/2017         RE: Rach Dick  [id number] [facility] [address]        Dear Colleague,    Thank you for referring your patient, Rach Dick, to the [facility]. Please see a copy of my visit note below.    Office Visit-Follow up    Chief Complaint: Rach Dick is a 62 year old female who is being seen for   Chief Complaint   Patient presents with     Fracture Followup     left ankle f/u doi 10/20/17       History of Present Illness:   Has been in boot but is tired of it, using crutches outside, no crutches inside, ace wrap at night    Doing HEP    REVIEW OF SYSTEMS  General: negative for, night sweats, dizziness, fatigue  Resp: No shortness of breath and no cough  CV: negative for chest pain, syncope or near-syncope  GI: negative for nausea, vomiting and diarrhea  : negative for dysuria and hematuria  Musculoskeletal: as above  Neurologic: negative for syncope   Hematologic: negative for bleeding disorder    Physical Exam:  Vitals: Temp 96.9  F (36.1  C)  Ht 1.549 m (5' 1\")  Wt 57.2 kg (126 lb)  BMI 23.81 kg/m2  BMI= Body mass index is 23.81 kg/(m^2).  Constitutional: healthy, alert and no acute distress   Psychiatric: mentation appears normal and affect normal/bright  NEURO: no focal deficits  RESP: Normal with easy respirations and no use of accessory muscles to breathe, no audible wheezing or retractions  CV: No peripheral edema  SKIN: No erythema, rashes, excoriation, or breakdown. No evidence of infection.   JOINT/EXTREMITIES:left Ankle Exam:   ANKLE  Inspection:Swelling:none   Tender:ankle joint line  Non-tender:lateral malleolus, medial malleolus  Range of Motion:stiff        GAIT: with assistive device            Diagnostic Modalities:  left ankle X-ray: bimalleolar fracture  Normal mortise  Independent visualization of the images was performed.      Impression: left Ankle fracture- bi malleolus   nonop tx 2 mos doing fine    Plan:  All of the above pertinent " physical exam and imaging modalities findings was reviewed with Rach.                                          CONSERVATIVE CARE:  I recommend conservative care for the patient to include NSAIDs, Tylenol, focused self directed physical therapy, activity modifications, d/c boot. Today I provided or dispensed HEP, work note.                                                FUTURE PLAN:  On their return if they still have symptoms we will consider physical therapy, NSAID's.        Return to clinic 4-6 , week(s), or sooner as needed for changes.  Re-x-ray on return: Yes.    Jazmin Alfaro M.D.        Again, thank you for allowing me to participate in the care of your patient.        Sincerely,        Jazmin Alfaro MD

## 2017-12-13 NOTE — NURSING NOTE
"Chief Complaint   Patient presents with     Fracture Followup     left ankle f/u doi 10/20/17       Initial Temp 96.9  F (36.1  C)  Ht 1.549 m (5' 1\")  Wt 57.2 kg (126 lb)  BMI 23.81 kg/m2 Estimated body mass index is 23.81 kg/(m^2) as calculated from the following:    Height as of this encounter: 1.549 m (5' 1\").    Weight as of this encounter: 57.2 kg (126 lb).  Medication Reconciliation: complete    BP completed using cuff size: NA (Not Taken)    Yenny Cordon MA      "

## 2017-12-13 NOTE — PATIENT INSTRUCTIONS
Ankle Alphabet (Flexibility)    These instructions are for your right foot. Switch sides for your left foot.  1. Sit on the floor with your legs straight in front of you.  2. Rest your right calf on a rolled-up towel. Use your foot to write the letters of the alphabet in mid-air.  3. Repeat this exercise 3 times a day, or as instructed.  Date Last Reviewed: 3/10/2016    5375-6791 [employer]. [address] [place] [address]. All rights reserved. This information is not intended as a substitute for professional medical care. Always follow your healthcare professional's instructions.        Foot and Ankle Exercises: Ankle Circles    This exercise is designed to stretch and strengthen your feet and ankles. Before beginning the exercise, read through all the instructions. While exercising, breathe normally. If you feel any pain, stop the exercise. If pain persists, inform your healthcare provider.    Sit straight-legged on the floor or other firm surface.    Resting your ______ calf on a rolled-up towel, use your foot to draw circles in both directions or write the letters of the alphabet in the air.    Continue for ______ seconds. Do ______ times a day.  Date Last Reviewed: 9/9/2015 2000-2017 [employer]. [address] [place] [address]. All rights reserved. This information is not intended as a substitute for professional medical care. Always follow your healthcare professional's instructions.        Bent-Knee Calf Stretch    This exercise is designed to stretch and strengthen your feet and ankles. Before beginning the exercise, read through all the instructions. While exercising, breathe normally and don t bounce. If you feel any pain, stop the exercise. If pain persists, inform your healthcare provider:    Stand an arm s length away from a wall. Place the palms of your hands on the wall. Step forward about 12 inches with your ______ foot.    Keeping toes pointed  forward and both heels on the floor, bend both knees and lean forward. Hold for ______ seconds. Relax.    Repeat ______ times. Do ______ sets a day.  Date Last Reviewed: 8/16/2015 2000-2017 [employer]. [address] [place] [address]. All rights reserved. This information is not intended as a substitute for professional medical care. Always follow your healthcare professional's instructions.        Calf Raise (Strength)    4. Stand up straight with both feet flat on the floor, slightly apart. Hold onto a sturdy chair, railing, counter, or table.  5. Raise both heels so you re standing on the balls of your feet. Don t lock your knees or arch your back. Hold for 5 seconds. Then slowly lower your heels back down to the floor.  6. Repeat 10 times, or as instructed.  7. Do this exercise 3 times a day, or as instructed.     Challenge yourself  As you become stronger, do this exercise on one foot at a time.   Date Last Reviewed: 3/10/2016    5539-5703 [employer]. [address] [place] [address]. All rights reserved. This information is not intended as a substitute for professional medical care. Always follow your healthcare professional's instructions.

## 2018-01-10 ENCOUNTER — OFFICE VISIT (OUTPATIENT)
Dept: ORTHOPEDICS | Facility: CLINIC | Age: 63
End: 2018-01-10
Payer: COMMERCIAL

## 2018-01-10 ENCOUNTER — RADIANT APPOINTMENT (OUTPATIENT)
Dept: GENERAL RADIOLOGY | Facility: CLINIC | Age: 63
End: 2018-01-10
Attending: ORTHOPAEDIC SURGERY
Payer: COMMERCIAL

## 2018-01-10 VITALS — TEMPERATURE: 97 F | BODY MASS INDEX: 26.06 KG/M2 | WEIGHT: 138 LBS | HEIGHT: 61 IN

## 2018-01-10 DIAGNOSIS — S82.65XD CLOSED NONDISPLACED FRACTURE OF LATERAL MALLEOLUS OF LEFT FIBULA WITH ROUTINE HEALING, SUBSEQUENT ENCOUNTER: Primary | ICD-10-CM

## 2018-01-10 DIAGNOSIS — S82.65XD CLOSED NONDISPLACED FRACTURE OF LATERAL MALLEOLUS OF LEFT FIBULA WITH ROUTINE HEALING, SUBSEQUENT ENCOUNTER: ICD-10-CM

## 2018-01-10 PROCEDURE — 73610 X-RAY EXAM OF ANKLE: CPT | Mod: TC

## 2018-01-10 PROCEDURE — 99207 ZZC FRACTURE CARE IN GLOBAL PERIOD: CPT | Performed by: ORTHOPAEDIC SURGERY

## 2018-01-10 ASSESSMENT — PAIN SCALES - GENERAL: PAINLEVEL: MODERATE PAIN (4)

## 2018-01-10 NOTE — PROGRESS NOTES
"Office Visit-Follow up    Chief Complaint: Rach Dick is a 62 year old female who is being seen for   Chief Complaint   Patient presents with     RECHECK     left ankle f/u doi 10/20/17       History of Present Illness:   Walking in hiking boot, wearing ankle support  Difficulty going up on toes, stairs, achilles feels tight, swells when standing for a while      REVIEW OF SYSTEMS  General: negative for, night sweats, dizziness, fatigue  Resp: No shortness of breath and no cough  CV: negative for chest pain, syncope or near-syncope  GI: negative for nausea, vomiting and diarrhea  : negative for dysuria and hematuria  Musculoskeletal: as above  Neurologic: negative for syncope   Hematologic: negative for bleeding disorder    Physical Exam:  Vitals: Temp 97  F (36.1  C)  Ht 1.549 m (5' 1\")  Wt 62.6 kg (138 lb)  BMI 26.07 kg/m2  BMI= Body mass index is 26.07 kg/(m^2).  Constitutional: healthy, alert and no acute distress   Psychiatric: mentation appears normal and affect normal/bright  NEURO: no focal deficits  RESP: Normal with easy respirations and no use of accessory muscles to breathe, no audible wheezing or retractions  CV: No peripheral edema  SKIN: No erythema, rashes, excoriation, or breakdown. No evidence of infection.   JOINT/EXTREMITIES:left Ankle Exam:   Inspection:Swelling:none   Tender:achilles tendon  Non-tender:lateral malleolus, medial malleolus  Range of Motion:good    GAIT: antalgic            Diagnostic Modalities:  left ankle X-ray: bimalleolar fracture  Good alignment, callous  Independent visualization of the images was performed.      Impression: left Ankle fracture- bi malleolus   3 mos out doing fine    Plan:  All of the above pertinent physical exam and imaging modalities findings was reviewed with Rach.                                          CONSERVATIVE CARE:  I recommend conservative care for the patient to include NSAIDs, Tylenol, focused self directed physical therapy, " formal physical therapy, activity modifications. Today I provided or dispensed physical therapy, HEP.    Restrictions: None, use as tolerated, Gradual return back to full activity        Return to clinic 6, week(s), or sooner as needed for changes.  Re-x-ray on return: No    Jazmin Alfaro M.D.

## 2018-01-10 NOTE — MR AVS SNAPSHOT
After Visit Summary   1/10/2018    Rach Dick    MRN: [id number]           Patient Information     Date Of Birth          1955        Visit Information        Provider Department      1/10/2018 1:50 PM Jazmin Alfaro MD [facility]        Today's Diagnoses     Closed nondisplaced fracture of lateral malleolus of left fibula with routine healing, subsequent encounter    -  1      Care Instructions      Ankle Alphabet (Flexibility)    These instructions are for your right foot. Switch sides for your left foot.  1. Sit on the floor with your legs straight in front of you.  2. Rest your right calf on a rolled-up towel. Use your foot to write the letters of the alphabet in mid-air.  3. Repeat this exercise 3 times a day, or as instructed.  Date Last Reviewed: 3/10/2016    0717-4364 [employer]. [address] [place] [address]. All rights reserved. This information is not intended as a substitute for professional medical care. Always follow your healthcare professional's instructions.        Ankle Dorsiflexion (Strength)                                   This exercise is for your right ankle. Switch sides for your left ankle.  4. Tie an elastic exercise band or tubing to the bottom part of a table. Tie the other end to your right foot.  5. Sit on the floor with your right leg straight. Sit far enough away from the table so that the elastic band or tubing is pulled tight between your foot and the table leg.  6. Slowly flex your right foot and pull your toes back toward you. Keep your right leg straight. Hold for 5 seconds.  7. Relax your foot.  8. Repeat this exercise 10 times.  Date Last Reviewed: 5/1/2016 2000-2017 [employer]. [address] [place] [address]. All rights reserved. This information is not intended as a substitute for professional medical care. Always follow your healthcare professional's  instructions.        Calf Raise (Strength)    9. Stand up straight with both feet flat on the floor, slightly apart. Hold onto a sturdy chair, railing, counter, or table.  10. Raise both heels so you re standing on the balls of your feet. Don t lock your knees or arch your back. Hold for 5 seconds. Then slowly lower your heels back down to the floor.  11. Repeat 10 times, or as instructed.  12. Do this exercise 3 times a day, or as instructed.     Challenge yourself  As you become stronger, do this exercise on one foot at a time.   Date Last Reviewed: 3/10/2016    9486-8453 [employer]. [address] [place] [address]. All rights reserved. This information is not intended as a substitute for professional medical care. Always follow your healthcare professional's instructions.        Bent-Knee Calf Stretch    This exercise is designed to stretch and strengthen your feet and ankles. Before beginning the exercise, read through all the instructions. While exercising, breathe normally and don t bounce. If you feel any pain, stop the exercise. If pain persists, inform your healthcare provider:    Stand an arm s length away from a wall. Place the palms of your hands on the wall. Step forward about 12 inches with your ______ foot.    Keeping toes pointed forward and both heels on the floor, bend both knees and lean forward. Hold for ______ seconds. Relax.    Repeat ______ times. Do ______ sets a day.  Date Last Reviewed: 8/16/2015 2000-2017 [employer]. [address] [place] [address]. All rights reserved. This information is not intended as a substitute for professional medical care. Always follow your healthcare professional's instructions.        Foot and Ankle Exercises: Single-Leg Heel Raise  This exercise is designed to stretch and strengthen your feet and ankles. Before beginning the exercise, read through all the instructions. While exercising, breathe normally and  don t bounce. If you feel any pain, stop the exercise. If pain persists, inform your healthcare provider:    Stand, using a sturdy counter for balance only. Lift 1 foot and stand with your weight on the other foot.    Rise up on your toes, then lower back onto your heel.    Repeat 10 times. Do 3 sets a day.  Date Last Reviewed: 8/16/2015 2000-2017 [employer]. [address] [place] [address]. All rights reserved. This information is not intended as a substitute for professional medical care. Always follow your healthcare professional's instructions.        Plantarflexion (Strength)    These instructions are for your right ankle. Switch sides for your left ankle.  13. Sit on a bed or the floor with your right leg out straight.  14. Loop an elastic exercise band or tubing around your right foot. Hold the ends in your hands.  15. Push on the elastic band with the ball of your foot, pointing your toes. Pull the elastic band toward as you do this. Hold for 5 seconds. Then move your foot back to the starting position.  16. Repeat 10 times, or as instructed.  17. Do this exercise 3 times a day, or as instructed.  Date Last Reviewed: 3/10/2016    3599-4904 [employer]. [address] [place] [address]. All rights reserved. This information is not intended as a substitute for professional medical care. Always follow your healthcare professional's instructions.                Follow-ups after your visit        Your next 10 appointments already scheduled     Jan 17, 2018  2:00 PM CST   Return Visit with Jazmin Alfaro MD   [facility]    [address] [place] [id number] [phone]              Who to contact     If you have questions or need follow up information about today's clinic visit or your schedule please contact [facility] directly at [phone].  Normal or non-critical lab and imaging  "results will be communicated to you by MyChart, letter or phone within 4 business days after the clinic has received the results. If you do not hear from us within 7 days, please contact the clinic through [employer] or phone. If you have a critical or abnormal lab result, we will notify you by phone as soon as possible.  Submit refill requests through [employer] or call your pharmacy and they will forward the refill request to us. Please allow 3 business days for your refill to be completed.          Additional Information About Your Visit        [employer] Information     [employer] gives you secure access to your electronic health record. If you see a primary care provider, you can also send messages to your care team and make appointments. If you have questions, please call your primary care clinic.  If you do not have a primary care provider, please call [phone] and they will assist you.        Care EveryWhere ID     This is your Care EveryWhere ID. This could be used by other organizations to access your [facility] medical records  [id number]        Your Vitals Were     Temperature Height BMI (Body Mass Index)             97  F (36.1  C) 1.549 m (5' 1\") 26.07 kg/m2          Blood Pressure from Last 3 Encounters:   09/07/17 102/62   06/29/16 118/80   06/08/16 110/60    Weight from Last 3 Encounters:   01/10/18 62.6 kg (138 lb)   12/13/17 57.2 kg (126 lb)   11/15/17 57.2 kg (126 lb)               Primary Care Provider Office Phone # Fax #    Venessa Chavez PA-C [phone] [address] [place] [address]        Equal Access to Services     [facility]: Hadii aad ku hadasho Soomaali, waaxda luqadaha, qaybta kaalmada adeegjose alberto, adele alexander. So [facility] [phone].    ATENCIÓN: Si habla español, tiene a cervantes disposición servicios gratuitos de asistencia lingüística. Llame al [phone].    We comply with applicable federal civil rights laws and [place] laws. We " do not discriminate on the basis of race, color, national origin, age, disability, sex, sexual orientation, or gender identity.            Thank you!     Thank you for choosing [facility]  for your care. Our goal is always to provide you with excellent care. Hearing back from our patients is one way we can continue to improve our services. Please take a few minutes to complete the written survey that you may receive in the mail after your visit with us. Thank you!             Your Updated Medication List - Protect others around you: Learn how to safely use, store and throw away your medicines at www.disposemymeds.org.          This list is accurate as of: 1/10/18  2:53 PM.  Always use your most recent med list.                   Brand Name Dispense Instructions for use Diagnosis    CALCIUM + D PO      2 TABLET ONCE A DAY    Labyrinthitis       chromium 200 MCG Caps capsule      Take 200 mcg by mouth daily.        cyclobenzaprine 10 MG tablet    FLEXERIL    14 tablet    Take 1 tablet (10 mg) by mouth nightly as needed for muscle spasms    Acute right-sided low back pain with right-sided sciatica       GLUCOSAMINE CHONDRO COMPLEX OR      TAKE TWO CAPSULES DAILY.        ibuprofen 800 MG tablet    ADVIL/MOTRIN    60 tablet    Take 1 tablet (800 mg) by mouth every 8 hours as needed for moderate pain    Closed nondisplaced fracture of lateral malleolus of left fibula, initial encounter       MULTIVITAMIN PO      TAKE ONE TABLET DAILY.        OMEGA 3 PO      TAKE TWO CAPSULES DAILY.        order for DME     1 Device    Knee scooter will need for at least 2 months    Closed nondisplaced fracture of lateral malleolus of left fibula, initial encounter       traMADol 50 MG tablet    ULTRAM    40 tablet    Take 1 tablet (50 mg) by mouth every 12 hours as needed for moderate pain    Closed nondisplaced fracture of lateral malleolus of left fibula, initial encounter       ZYRTEC ALLERGY 10 MG tablet   Generic  drug:  cetirizine      Take 10 mg by mouth daily

## 2018-01-10 NOTE — LETTER
"    1/10/2018         RE: Rach Dick  [id number] [facility] [address]        Dear Colleague,    Thank you for referring your patient, Rach Dick, to the [facility]. Please see a copy of my visit note below.    Office Visit-Follow up    Chief Complaint: Rach Dick is a 62 year old female who is being seen for   Chief Complaint   Patient presents with     RECHECK     left ankle f/u doi 10/20/17       History of Present Illness:   Walking in hiking boot, wearing ankle support  Difficulty going up on toes, stairs, achilles feels tight, swells when standing for a while      REVIEW OF SYSTEMS  General: negative for, night sweats, dizziness, fatigue  Resp: No shortness of breath and no cough  CV: negative for chest pain, syncope or near-syncope  GI: negative for nausea, vomiting and diarrhea  : negative for dysuria and hematuria  Musculoskeletal: as above  Neurologic: negative for syncope   Hematologic: negative for bleeding disorder    Physical Exam:  Vitals: Temp 97  F (36.1  C)  Ht 1.549 m (5' 1\")  Wt 62.6 kg (138 lb)  BMI 26.07 kg/m2  BMI= Body mass index is 26.07 kg/(m^2).  Constitutional: healthy, alert and no acute distress   Psychiatric: mentation appears normal and affect normal/bright  NEURO: no focal deficits  RESP: Normal with easy respirations and no use of accessory muscles to breathe, no audible wheezing or retractions  CV: No peripheral edema  SKIN: No erythema, rashes, excoriation, or breakdown. No evidence of infection.   JOINT/EXTREMITIES:left Ankle Exam:   Inspection:Swelling:none   Tender:achilles tendon  Non-tender:lateral malleolus, medial malleolus  Range of Motion:good    GAIT: antalgic            Diagnostic Modalities:  left ankle X-ray: bimalleolar fracture  Good alignment, callous  Independent visualization of the images was performed.      Impression: left Ankle fracture- bi malleolus   3 mos out doing fine    Plan:  All of the above pertinent physical " exam and imaging modalities findings was reviewed with Rach.                                          CONSERVATIVE CARE:  I recommend conservative care for the patient to include NSAIDs, Tylenol, focused self directed physical therapy, formal physical therapy, activity modifications. Today I provided or dispensed physical therapy, HEP.    Restrictions: None, use as tolerated, Gradual return back to full activity        Return to clinic 6, week(s), or sooner as needed for changes.  Re-x-ray on return: No    Jazmin Alfaro M.D.        Again, thank you for allowing me to participate in the care of your patient.        Sincerely,        Jazmin Alfaro MD

## 2018-01-10 NOTE — NURSING NOTE
"Chief Complaint   Patient presents with     RECHECK     left ankle f/u doi 10/20/17       Initial Temp 97  F (36.1  C)  Ht 1.549 m (5' 1\")  Wt 62.6 kg (138 lb)  BMI 26.07 kg/m2 Estimated body mass index is 26.07 kg/(m^2) as calculated from the following:    Height as of this encounter: 1.549 m (5' 1\").    Weight as of this encounter: 62.6 kg (138 lb).  Medication Reconciliation: complete    BP completed using cuff size: NA (Not Taken)    Yenny Cordon MA      "

## 2018-01-10 NOTE — PATIENT INSTRUCTIONS
Ankle Alphabet (Flexibility)    These instructions are for your right foot. Switch sides for your left foot.  1. Sit on the floor with your legs straight in front of you.  2. Rest your right calf on a rolled-up towel. Use your foot to write the letters of the alphabet in mid-air.  3. Repeat this exercise 3 times a day, or as instructed.  Date Last Reviewed: 3/10/2016    9702-5629 [employer]. [address] [place] [address]. All rights reserved. This information is not intended as a substitute for professional medical care. Always follow your healthcare professional's instructions.        Ankle Dorsiflexion (Strength)                                   This exercise is for your right ankle. Switch sides for your left ankle.  4. Tie an elastic exercise band or tubing to the bottom part of a table. Tie the other end to your right foot.  5. Sit on the floor with your right leg straight. Sit far enough away from the table so that the elastic band or tubing is pulled tight between your foot and the table leg.  6. Slowly flex your right foot and pull your toes back toward you. Keep your right leg straight. Hold for 5 seconds.  7. Relax your foot.  8. Repeat this exercise 10 times.  Date Last Reviewed: 5/1/2016 2000-2017 [employer]. [address] [place] [address]. All rights reserved. This information is not intended as a substitute for professional medical care. Always follow your healthcare professional's instructions.        Calf Raise (Strength)    9. Stand up straight with both feet flat on the floor, slightly apart. Hold onto a sturdy chair, railing, counter, or table.  10. Raise both heels so you re standing on the balls of your feet. Don t lock your knees or arch your back. Hold for 5 seconds. Then slowly lower your heels back down to the floor.  11. Repeat 10 times, or as instructed.  12. Do this exercise 3 times a day, or as instructed.     Challenge  yourself  As you become stronger, do this exercise on one foot at a time.   Date Last Reviewed: 3/10/2016    8881-9302 [employer]. [address] [place] [address]. All rights reserved. This information is not intended as a substitute for professional medical care. Always follow your healthcare professional's instructions.        Bent-Knee Calf Stretch    This exercise is designed to stretch and strengthen your feet and ankles. Before beginning the exercise, read through all the instructions. While exercising, breathe normally and don t bounce. If you feel any pain, stop the exercise. If pain persists, inform your healthcare provider:    Stand an arm s length away from a wall. Place the palms of your hands on the wall. Step forward about 12 inches with your ______ foot.    Keeping toes pointed forward and both heels on the floor, bend both knees and lean forward. Hold for ______ seconds. Relax.    Repeat ______ times. Do ______ sets a day.  Date Last Reviewed: 8/16/2015 2000-2017 [employer]. [address] [place] [address]. All rights reserved. This information is not intended as a substitute for professional medical care. Always follow your healthcare professional's instructions.        Foot and Ankle Exercises: Single-Leg Heel Raise  This exercise is designed to stretch and strengthen your feet and ankles. Before beginning the exercise, read through all the instructions. While exercising, breathe normally and don t bounce. If you feel any pain, stop the exercise. If pain persists, inform your healthcare provider:    Stand, using a sturdy counter for balance only. Lift 1 foot and stand with your weight on the other foot.    Rise up on your toes, then lower back onto your heel.    Repeat 10 times. Do 3 sets a day.  Date Last Reviewed: 8/16/2015 2000-2017 [employer]. [address] [place] [address]. All rights reserved. This information  is not intended as a substitute for professional medical care. Always follow your healthcare professional's instructions.        Plantarflexion (Strength)    These instructions are for your right ankle. Switch sides for your left ankle.  13. Sit on a bed or the floor with your right leg out straight.  14. Loop an elastic exercise band or tubing around your right foot. Hold the ends in your hands.  15. Push on the elastic band with the ball of your foot, pointing your toes. Pull the elastic band toward as you do this. Hold for 5 seconds. Then move your foot back to the starting position.  16. Repeat 10 times, or as instructed.  17. Do this exercise 3 times a day, or as instructed.  Date Last Reviewed: 3/10/2016    0723-1758 [employer]. [address] [place] [address], [place] [address]. All rights reserved. This information is not intended as a substitute for professional medical care. Always follow your healthcare professional's instructions.

## 2018-01-16 ENCOUNTER — HOSPITAL ENCOUNTER (OUTPATIENT)
Dept: PHYSICAL THERAPY | Facility: OTHER | Age: 63
Setting detail: THERAPIES SERIES
End: 2018-01-16
Attending: ORTHOPAEDIC SURGERY
Payer: COMMERCIAL

## 2018-01-16 PROCEDURE — G8978 MOBILITY CURRENT STATUS: HCPCS | Mod: GP,CL | Performed by: PHYSICAL THERAPIST

## 2018-01-16 PROCEDURE — 97110 THERAPEUTIC EXERCISES: CPT | Mod: GP | Performed by: PHYSICAL THERAPIST

## 2018-01-16 PROCEDURE — 97161 PT EVAL LOW COMPLEX 20 MIN: CPT | Mod: GP | Performed by: PHYSICAL THERAPIST

## 2018-01-16 PROCEDURE — 40000718 ZZHC STATISTIC PT DEPARTMENT ORTHO VISIT: Performed by: PHYSICAL THERAPIST

## 2018-01-16 PROCEDURE — G8979 MOBILITY GOAL STATUS: HCPCS | Mod: GP,CI | Performed by: PHYSICAL THERAPIST

## 2018-01-19 ENCOUNTER — TELEPHONE (OUTPATIENT)
Dept: ORTHOPEDICS | Facility: CLINIC | Age: 63
End: 2018-01-19

## 2018-01-19 NOTE — LETTER
[address] [address]  [phone]        January 19, 2018    Re:Rach Dick        [address] [facility] [address]          To Whom It May Concern,    Rach has been under my care for a leg injury. She will be returning to work February 1, 2018. Her restriction is NO playground duty until after her next reassessment on February 21, 2018.    Sincerely,        Dr. MARRY Alfaro MD

## 2018-01-19 NOTE — TELEPHONE ENCOUNTER
Reason for Call:  Other RETURN TO WORK    Detailed comments: Patient is calling has concerns about going back to work. Not sure about standing at work. PLease call.    Phone Number Patient can be reached at: Cell number on file:    Telephone Information:   Mobile [phone]       Best Time: na    Can we leave a detailed message on this number? YES    Call taken on 1/19/2018 at 10:27 AM by Callie Marcus

## 2018-01-19 NOTE — TELEPHONE ENCOUNTER
PT states that she does not feel she can go back to work next week. Her work has given her off until end of February. She would like to RTW 2/1/18 with restriction of no playground duty until after next appt 2/21/18.  Are you Ok with writing this? Will  at 1300 today. VENKATA Cai

## 2018-01-24 ENCOUNTER — HOSPITAL ENCOUNTER (OUTPATIENT)
Dept: PHYSICAL THERAPY | Facility: OTHER | Age: 63
Setting detail: THERAPIES SERIES
End: 2018-01-24
Attending: ORTHOPAEDIC SURGERY
Payer: COMMERCIAL

## 2018-01-24 PROCEDURE — 40000718 ZZHC STATISTIC PT DEPARTMENT ORTHO VISIT: Performed by: PHYSICAL THERAPIST

## 2018-01-24 PROCEDURE — 97110 THERAPEUTIC EXERCISES: CPT | Mod: GP | Performed by: PHYSICAL THERAPIST

## 2018-01-24 PROCEDURE — 97112 NEUROMUSCULAR REEDUCATION: CPT | Mod: GP | Performed by: PHYSICAL THERAPIST

## 2018-01-25 ENCOUNTER — HOSPITAL ENCOUNTER (OUTPATIENT)
Dept: PHYSICAL THERAPY | Facility: OTHER | Age: 63
Setting detail: THERAPIES SERIES
End: 2018-01-25
Attending: ORTHOPAEDIC SURGERY
Payer: COMMERCIAL

## 2018-01-25 PROCEDURE — 97116 GAIT TRAINING THERAPY: CPT | Mod: GP | Performed by: PHYSICAL THERAPIST

## 2018-01-25 PROCEDURE — 40000718 ZZHC STATISTIC PT DEPARTMENT ORTHO VISIT: Performed by: PHYSICAL THERAPIST

## 2018-01-25 PROCEDURE — 97110 THERAPEUTIC EXERCISES: CPT | Mod: GP | Performed by: PHYSICAL THERAPIST

## 2018-01-29 ENCOUNTER — HOSPITAL ENCOUNTER (OUTPATIENT)
Dept: PHYSICAL THERAPY | Facility: OTHER | Age: 63
Setting detail: THERAPIES SERIES
End: 2018-01-29
Attending: ORTHOPAEDIC SURGERY
Payer: COMMERCIAL

## 2018-01-29 PROCEDURE — 97110 THERAPEUTIC EXERCISES: CPT | Mod: GP | Performed by: PHYSICAL THERAPIST

## 2018-01-29 PROCEDURE — 97530 THERAPEUTIC ACTIVITIES: CPT | Mod: GP | Performed by: PHYSICAL THERAPIST

## 2018-01-29 PROCEDURE — 40000718 ZZHC STATISTIC PT DEPARTMENT ORTHO VISIT: Performed by: PHYSICAL THERAPIST

## 2018-01-31 ENCOUNTER — HOSPITAL ENCOUNTER (OUTPATIENT)
Dept: PHYSICAL THERAPY | Facility: OTHER | Age: 63
Setting detail: THERAPIES SERIES
End: 2018-01-31
Attending: ORTHOPAEDIC SURGERY
Payer: COMMERCIAL

## 2018-01-31 PROCEDURE — 97140 MANUAL THERAPY 1/> REGIONS: CPT | Mod: GP | Performed by: PHYSICAL THERAPIST

## 2018-01-31 PROCEDURE — 97110 THERAPEUTIC EXERCISES: CPT | Mod: GP | Performed by: PHYSICAL THERAPIST

## 2018-01-31 PROCEDURE — 40000718 ZZHC STATISTIC PT DEPARTMENT ORTHO VISIT: Performed by: PHYSICAL THERAPIST

## 2018-01-31 PROCEDURE — 97530 THERAPEUTIC ACTIVITIES: CPT | Mod: GP,XU | Performed by: PHYSICAL THERAPIST

## 2018-02-05 ENCOUNTER — HOSPITAL ENCOUNTER (OUTPATIENT)
Dept: PHYSICAL THERAPY | Facility: OTHER | Age: 63
Setting detail: THERAPIES SERIES
End: 2018-02-05
Attending: ORTHOPAEDIC SURGERY
Payer: COMMERCIAL

## 2018-02-05 PROCEDURE — 97140 MANUAL THERAPY 1/> REGIONS: CPT | Mod: GP | Performed by: PHYSICAL THERAPIST

## 2018-02-05 PROCEDURE — 40000718 ZZHC STATISTIC PT DEPARTMENT ORTHO VISIT: Performed by: PHYSICAL THERAPIST

## 2018-02-05 PROCEDURE — 97110 THERAPEUTIC EXERCISES: CPT | Mod: GP | Performed by: PHYSICAL THERAPIST

## 2018-02-05 PROCEDURE — 97530 THERAPEUTIC ACTIVITIES: CPT | Mod: GP | Performed by: PHYSICAL THERAPIST

## 2018-02-12 ENCOUNTER — HOSPITAL ENCOUNTER (OUTPATIENT)
Dept: PHYSICAL THERAPY | Facility: OTHER | Age: 63
Setting detail: THERAPIES SERIES
End: 2018-02-12
Attending: ORTHOPAEDIC SURGERY
Payer: COMMERCIAL

## 2018-02-12 PROCEDURE — 97110 THERAPEUTIC EXERCISES: CPT | Mod: GP | Performed by: PHYSICAL THERAPIST

## 2018-02-12 PROCEDURE — 97530 THERAPEUTIC ACTIVITIES: CPT | Mod: GP | Performed by: PHYSICAL THERAPIST

## 2018-02-12 PROCEDURE — 97014 ELECTRIC STIMULATION THERAPY: CPT | Mod: GP | Performed by: PHYSICAL THERAPIST

## 2018-02-12 PROCEDURE — 40000718 ZZHC STATISTIC PT DEPARTMENT ORTHO VISIT: Performed by: PHYSICAL THERAPIST

## 2018-02-12 NOTE — PROGRESS NOTES
"Outpatient Physical Therapy Progress Note     Patient: Rach Dick  : 1955    Beginning/End Dates of Reporting Period:  18 to 2018 (pt seen for 7 of 9 authorized visits in that time)    Referring Provider: Jazmin Alfaro MD    Therapy Diagnosis: left ankle pain, swelling, decreased ROM, weakness, imbalance, altered gait pattern, decreased functional level following left lateral malleolus fracture 10/20/17     Client Self Report: 2-3/10 L ankle pain on average, 5/10 on high when reporting on 18 visit. Has been trying to do more on her feet but then pain increases. Has been trying to do more lately ie: sweeping, scrubbing floor, photography but sx increase. Worse with weight bearing. Cane helps decrease pain 1 point with ambulation. Limps without using cane.     Objective Measurements:18  Objective Measure: left ankle AROM DF, PF, inversion, eversion  Details: 13 AROM DF, 15 with assist, 60 degree, 30 degrees, 10 degrees  Objective Measure: left ankle MMT  Details: 5/5 DF, inversion and eversion, too painful to do single leg heel raise  Objective Measure: L SLS  Details: 1-2\" but limited due to pain  Objective Measure: LEFS (28/80 at eval) (38/80 oon 18)  Details: 32/80 today  Objective Measure: reported functional level (25% at eval on 18)  Details: better but still difficulty with prolonged wt bearing 30' or more      Goals:  Goal Identifier ROM   Goal Description pt will improve left ankle AROM to be near that of the right ankle to help normalize gait without assistive device   Target Date 18   Date Met      Progress:     Goal Identifier function   Goal Description pt will improve left ankle ROM and strength and carry over to improved funcitonal level as measured by improved LEFS score from 28/80 to 45/80 or higher    Target Date 18   Date Met      Progress:         Progress Toward Goals:   Progress this reporting period: pt has better ankle ROM but not " able to normalize gait yet due to pain. Goal 2 not met due to pain. Her LEFS had improved from 28 to 38/80 on 1/31/18 but down to 32/80 on 2/12/18 with pt noting her score went down since she tried doing more activities in wt bearing. Pain greatest over lateral malleolus. Minimal swelling present.    Plan:  Continue therapy per current plan of care. Pt has 2 PT visits left through 4/16/18 and has good understanding of HEP for ROM, strengthening, proprioception as pain allows.     Discharge:  No

## 2018-02-20 NOTE — ADDENDUM NOTE
Encounter addended by: Luis Alberto Mcgraw, PT on: 2/20/2018  9:28 AM<BR>     Actions taken: Flowsheet accepted, Charge Capture section accepted

## 2018-02-21 ENCOUNTER — OFFICE VISIT (OUTPATIENT)
Dept: ORTHOPEDICS | Facility: CLINIC | Age: 63
End: 2018-02-21
Payer: COMMERCIAL

## 2018-02-21 VITALS — TEMPERATURE: 96.7 F | BODY MASS INDEX: 25.49 KG/M2 | HEIGHT: 61 IN | WEIGHT: 135 LBS

## 2018-02-21 DIAGNOSIS — S82.842A BIMALLEOLAR FRACTURE, LEFT, CLOSED, INITIAL ENCOUNTER: Primary | ICD-10-CM

## 2018-02-21 PROCEDURE — 99212 OFFICE O/P EST SF 10 MIN: CPT | Performed by: ORTHOPAEDIC SURGERY

## 2018-02-21 ASSESSMENT — PAIN SCALES - GENERAL: PAINLEVEL: MODERATE PAIN (4)

## 2018-02-21 NOTE — NURSING NOTE
"Chief Complaint   Patient presents with     Fracture Followup     left ankle f/u doi 10/20/17       Initial Temp 96.7  F (35.9  C)  Ht 1.549 m (5' 1\")  Wt 61.2 kg (135 lb)  BMI 25.51 kg/m2 Estimated body mass index is 25.51 kg/(m^2) as calculated from the following:    Height as of this encounter: 1.549 m (5' 1\").    Weight as of this encounter: 61.2 kg (135 lb).  Medication Reconciliation: complete    BP completed using cuff size: NA (Not Taken)    Yenny Cordon MA      "

## 2018-02-21 NOTE — PATIENT INSTRUCTIONS
Encounter Diagnosis   Name Primary?     Bimalleolar fracture, left, closed Yes     Rest, ice and elevate above heart level as needed for pain control  Plan:  1. Xrays: No xray needed today.  2. Anticoagulation: Not needed.    3. Pain Medication: We would recommend Arnica Gel.  Continue elevating and ice.  We know you want to avoid pain medication.  We agree.  4. Weight Bearing: Weight bearing as tolerated left lower extremity  5. Activity/Therapy: Continue your exercises.  You have done formal physical therapy already.  6. Cast/Splint/Brace/Sling: not needed  7. Work: We will keep you off work.  We wrote out of work x 6 weeks, call if you feel better sooner and want to go back.   8. We discussed a cortisone injection that may provide some relief by taking inflamation away; but we decided to hold off on that today.    9. You will get better but it is just taking longer.    Follow-up:  Follow up with Jazmin Alfaro MD and/or Raymundo Forbes PA-C on an as needed basis.     [employer] and [employer] may offer reliable information regarding your diagnosis and treatment plan.    THANK YOU for coming in today. If you receive a survey via [employer] or mail please let us know if there was anything you especially appreciated today or if there is any way we can improve our clinic. We appreciate your input.    GENERAL INFORMATION:  Our hours are:  Monday :     Clinic 7:30 AM-430 PM ([facility])  Tuesday:      Operating Room All Day ([facility])  Wednesday: Clinic 7:30 AM - 11:15 AM ([facility])                        Clinic 1:00 PM - 4:00PM ([facility])  Thursday:     Administrative Day  Friday:          Clinic 7:30 AM - 11:15 AM ([facility])                       Clinic 1:00 PM - 4:00 PM ([facility])    Bone and Joint Service Line for any issues or concerns: [phone]      We are not in the  office Thursdays. Therefore non- urgent calls and medical messages received on Thursday will be addressed when we are back in the office on Wednesday. Urgent matters will be reviewed and addressed by one of our partners in the office as needed.    If lab work was done today as part of your evaluation you will generally be contacted via [employer]ostele[employer], mail, or phone with the results within 1-5 days. If there is an alarming result we will contact you by phone. Lab results come back at varying times, I generally wait until all labs are resulted before making comments on results. Please note labs are automatically released to [employer] (if you have signed up for it) once available-at times you may see these prior to my having a chance to review them as well.    If you need refills please contact your pharmacist. They will send a refill request to me to review. Please allow 3 business days for us to process all refill requests. All narcotic refills should be handled in the clinic at the time of your visit.

## 2018-02-21 NOTE — LETTER
[address] [address]  Phone: [phone]  Fax: [phone]    February 21, 2018        Rach Dick  [address] [address] [place] [address]          To whom it may concern:    RE: Rach Dick    Patient was seen and treated today at our clinic.  Patient will be off work for 6 weeks.     Please contact me for questions or concerns.      Sincerely,        Raymundo Forbes PA-C

## 2018-02-21 NOTE — LETTER
"    2/21/2018         RE: Rach Dick  [address] [facility] [address]        Dear Colleague,    Thank you for referring your patient, Rach Dick, to the [facility]. Please see a copy of my visit note below.    Office Visit-Follow up    Chief Complaint: Rach Dick is a 63 year old female who is being seen for   Chief Complaint   Patient presents with     Fracture Followup     left ankle f/u doi 10/20/17       History of Present Illness:   Pain 4/10, feels frustrated  Can't go back to work until able to do full duty  Achilles, burning anterior deep, global pain, swells  Can't be on feet or walk for a along time   Already did PT    REVIEW OF SYSTEMS  General: negative for, night sweats, dizziness, fatigue  Resp: No shortness of breath and no cough  CV: negative for chest pain, syncope or near-syncope  GI: negative for nausea, vomiting and diarrhea  : negative for dysuria and hematuria  Musculoskeletal: as above  Neurologic: negative for syncope   Hematologic: negative for bleeding disorder    Physical Exam:  Vitals: Temp 96.7  F (35.9  C)  Ht 1.549 m (5' 1\")  Wt 61.2 kg (135 lb)  BMI 25.51 kg/m2  BMI= Body mass index is 25.51 kg/(m^2).  Constitutional: healthy, alert and no acute distress   Psychiatric: mentation appears normal and affect normal/bright  NEURO: no focal deficits  RESP: Normal with easy respirations and no use of accessory muscles to breathe, no audible wheezing or retractions  CV: No peripheral edema  SKIN: No erythema, rashes, excoriation, or breakdown. No evidence of infection.   JOINT/EXTREMITIES:left Ankle Exam:     ANKLE  Inspection:Swelling:none   Tender:ATFL, deltoid ligament, achilles tendon, ankle joint  Range of Motion:full  Strength:decent      GAIT: antalgic            Diagnostic Modalities:  None today.        Impression: left Ankle fracture- bi malleolus  - nonop tx for 4.5 mos, still with stiffness and pain    Plan:  All of the above pertinent " physical exam and imaging modalities findings was reviewed with Rach.                                          CONSERVATIVE CARE:  I recommend conservative care for the patient to include NSAIDs, Tylenol, focused self directed physical therapy, activity modifications. Today I provided or dispensed note to be OOW.                                                FUTURE PLAN:  On their return if they still have symptoms we will consider physical therapy, referral to Dr Reed, NSAID's, injection of steroids.        Return to clinic PRN, or sooner as needed for changes.  Re-x-ray on return: No    Jazmin Alfaro M.D.          Again, thank you for allowing me to participate in the care of your patient.        Sincerely,        Jazmin Alfaro MD

## 2018-02-21 NOTE — MR AVS SNAPSHOT
After Visit Summary   2/21/2018    Rach Dick    MRN: [id number]           Patient Information     Date Of Birth          1955        Visit Information        Provider Department      2/21/2018 4:20 PM Jazmin Alfaro MD [facility]        Today's Diagnoses     Bimalleolar fracture, left, closed    -  1      Care Instructions    Encounter Diagnosis   Name Primary?     Bimalleolar fracture, left, closed Yes     Rest, ice and elevate above heart level as needed for pain control  Plan:  1. Xrays: No xray needed today.  2. Anticoagulation: Not needed.    3. Pain Medication: We would recommend Arnica Gel.  Continue elevating and ice.  We know you want to avoid pain medication.  We agree.  4. Weight Bearing: Weight bearing as tolerated left lower extremity  5. Activity/Therapy: Continue your exercises.  You have done formal physical therapy already.  6. Cast/Splint/Brace/Sling: not needed  7. Work: We will keep you off work.  We wrote out of work x 6 weeks, call if you feel better sooner and want to go back.   8. We discussed a cortisone injection that may provide some relief by taking inflamation away; but we decided to hold off on that today.    9. You will get better but it is just taking longer.    Follow-up:  Follow up with Jazmin Alfaro MD and/or Raymundo Forbes PA-C on an as needed basis.     [employer] and [employer] may offer reliable information regarding your diagnosis and treatment plan.    THANK YOU for coming in today. If you receive a survey via [employer] or mail please let us know if there was anything you especially appreciated today or if there is any way we can improve our clinic. We appreciate your input.    GENERAL INFORMATION:  Our hours are:  Monday :     Clinic 7:30 AM-430 PM ([facility])  Tuesday:      Operating Room All Day ([facility])  Wednesday: Clinic 7:30 AM - 11:15 AM ([facility])                        Clinic 1:00 PM - 4:00PM ([facility])  Thursday:     Administrative Day  Friday:          Clinic 7:30 AM - 11:15 AM ([facility])                       Clinic 1:00 PM - 4:00 PM ([facility])    Bone and Joint Service Line for any issues or concerns: [phone]      We are not in the office Thursdays. Therefore non- urgent calls and medical messages received on Thursday will be addressed when we are back in the office on Wednesday. Urgent matters will be reviewed and addressed by one of our partners in the office as needed.    If lab work was done today as part of your evaluation you will generally be contacted via [address], mail, or phone with the results within 1-5 days. If there is an alarming result we will contact you by phone. Lab results come back at varying times, I generally wait until all labs are resulted before making comments on results. Please note labs are automatically released to [address] (if you have signed up for it) once available-at times you may see these prior to my having a chance to review them as well.    If you need refills please contact your pharmacist. They will send a refill request to me to review. Please allow 3 business days for us to process all refill requests. All narcotic refills should be handled in the clinic at the time of your visit.           Follow-ups after your visit        Who to contact     If you have questions or need follow up information about today's clinic visit or your schedule please contact [facility] directly at [phone].  Normal or non-critical lab and imaging results will be communicated to you by MyChart, letter or phone within 4 business days after the clinic has received the results. If you do not hear from us within 7 days, please contact the clinic through EntropySoftt or phone. If you have a critical or abnormal lab result, we will notify you by  "phone as soon as possible.  Submit refill requests through [employer] or call your pharmacy and they will forward the refill request to us. Please allow 3 business days for your refill to be completed.          Additional Information About Your Visit        Antennahart Information     [employer] gives you secure access to your electronic health record. If you see a primary care provider, you can also send messages to your care team and make appointments. If you have questions, please call your primary care clinic.  If you do not have a primary care provider, please call [phone] and they will assist you.        Care EveryWhere ID     This is your Care EveryWhere ID. This could be used by other organizations to access your [facility] medical records  [id number]        Your Vitals Were     Temperature Height BMI (Body Mass Index)             96.7  F (35.9  C) 1.549 m (5' 1\") 25.51 kg/m2          Blood Pressure from Last 3 Encounters:   09/07/17 102/62   06/29/16 118/80   06/08/16 110/60    Weight from Last 3 Encounters:   02/21/18 61.2 kg (135 lb)   01/10/18 62.6 kg (138 lb)   12/13/17 57.2 kg (126 lb)              Today, you had the following     No orders found for display       Primary Care Provider Office Phone # Fax #    Venessa Chavez PA-C [phone] [address] [place] [address]        Equal Access to Services     [facility] : Hadii carmen ku hadasho Soomaali, waaxda luqadaha, qaybta kaalmada adeegyada, adele hilliard . So [facility] [phone].    ATENCIÓN: Si habla español, tiene a cervantes disposición servicios gratuitos de asistencia lingüística. Llame al [phone].    We comply with applicable federal civil rights laws and [place] laws. We do not discriminate on the basis of race, color, national origin, age, disability, sex, sexual orientation, or gender identity.            Thank you!     Thank you for choosing [facility]  for your care. Our " goal is always to provide you with excellent care. Hearing back from our patients is one way we can continue to improve our services. Please take a few minutes to complete the written survey that you may receive in the mail after your visit with us. Thank you!             Your Updated Medication List - Protect others around you: Learn how to safely use, store and throw away your medicines at www.disposemymeds.org.          This list is accurate as of 2/21/18  4:40 PM.  Always use your most recent med list.                   Brand Name Dispense Instructions for use Diagnosis    CALCIUM + D PO      2 TABLET ONCE A DAY    Labyrinthitis       chromium 200 MCG Caps capsule      Take 200 mcg by mouth daily.        cyclobenzaprine 10 MG tablet    FLEXERIL    14 tablet    Take 1 tablet (10 mg) by mouth nightly as needed for muscle spasms    Acute right-sided low back pain with right-sided sciatica       GLUCOSAMINE CHONDRO COMPLEX OR      TAKE TWO CAPSULES DAILY.        ibuprofen 800 MG tablet    ADVIL/MOTRIN    60 tablet    Take 1 tablet (800 mg) by mouth every 8 hours as needed for moderate pain    Closed nondisplaced fracture of lateral malleolus of left fibula, initial encounter       MULTIVITAMIN PO      TAKE ONE TABLET DAILY.        OMEGA 3 PO      TAKE TWO CAPSULES DAILY.        order for DME     1 Device    Knee scooter will need for at least 2 months    Closed nondisplaced fracture of lateral malleolus of left fibula, initial encounter       traMADol 50 MG tablet    ULTRAM    40 tablet    Take 1 tablet (50 mg) by mouth every 12 hours as needed for moderate pain    Closed nondisplaced fracture of lateral malleolus of left fibula, initial encounter       ZYRTEC ALLERGY 10 MG tablet   Generic drug:  cetirizine      Take 10 mg by mouth daily

## 2018-02-21 NOTE — PROGRESS NOTES
"Office Visit-Follow up    Chief Complaint: Rach Dick is a 63 year old female who is being seen for   Chief Complaint   Patient presents with     Fracture Followup     left ankle f/u doi 10/20/17       History of Present Illness:   Pain 4/10, feels frustrated  Can't go back to work until able to do full duty  Achilles, burning anterior deep, global pain, swells  Can't be on feet or walk for a along time   Already did PT    REVIEW OF SYSTEMS  General: negative for, night sweats, dizziness, fatigue  Resp: No shortness of breath and no cough  CV: negative for chest pain, syncope or near-syncope  GI: negative for nausea, vomiting and diarrhea  : negative for dysuria and hematuria  Musculoskeletal: as above  Neurologic: negative for syncope   Hematologic: negative for bleeding disorder    Physical Exam:  Vitals: Temp 96.7  F (35.9  C)  Ht 1.549 m (5' 1\")  Wt 61.2 kg (135 lb)  BMI 25.51 kg/m2  BMI= Body mass index is 25.51 kg/(m^2).  Constitutional: healthy, alert and no acute distress   Psychiatric: mentation appears normal and affect normal/bright  NEURO: no focal deficits  RESP: Normal with easy respirations and no use of accessory muscles to breathe, no audible wheezing or retractions  CV: No peripheral edema  SKIN: No erythema, rashes, excoriation, or breakdown. No evidence of infection.   JOINT/EXTREMITIES:left Ankle Exam:     ANKLE  Inspection:Swelling:none   Tender:ATFL, deltoid ligament, achilles tendon, ankle joint  Range of Motion:full  Strength:decent      GAIT: antalgic            Diagnostic Modalities:  None today.        Impression: left Ankle fracture- bi malleolus  - nonop tx for 4.5 mos, still with stiffness and pain    Plan:  All of the above pertinent physical exam and imaging modalities findings was reviewed with Rach.                                          CONSERVATIVE CARE:  I recommend conservative care for the patient to include NSAIDs, Tylenol, focused self directed physical " therapy, activity modifications. Today I provided or dispensed note to be OOW.                                                FUTURE PLAN:  On their return if they still have symptoms we will consider physical therapy, referral to Dr Rede, NSAID's, injection of steroids.        Return to clinic PRN, or sooner as needed for changes.  Re-x-ray on return: No    Jazmin Alfaro M.D.

## 2018-02-23 ENCOUNTER — TELEPHONE (OUTPATIENT)
Dept: ORTHOPEDICS | Facility: CLINIC | Age: 63
End: 2018-02-23

## 2018-02-23 NOTE — LETTER
[address] [address]  Phone: [phone]  Fax: [phone]    February 26, 2018        Rach Dick  [address] [address] [place] [address]          To whom it may concern:    RE: Rach Dick    Patient may return to work. [employer] for 3 hours a day.  Please contact me for questions or concerns.      Sincerely,        Jazmin Alfaro MD

## 2018-02-23 NOTE — TELEPHONE ENCOUNTER
Pt calling back with Fax # to her employer please fax to # Attn:Giovani [phone]. Thank You Winnie

## 2018-02-23 NOTE — TELEPHONE ENCOUNTER
Reason for Call:  Other call back/ return to work note    Detailed comments: Patient states her employer is going to accommodate her condition and she needs a note to return to work as [employer] for 3 hours a day.  Please call her to notify when it's ready and at that time she would like it faxed to her home phone number.  Thanks    Phone Number Patient can be reached at: Home number on file [phone] (home)    Best Time: any    Can we leave a detailed message on this number? YES    Call taken on 2/23/2018 at 8:49 AM by Mansi Patel

## 2018-03-30 ENCOUNTER — TELEPHONE (OUTPATIENT)
Dept: FAMILY MEDICINE | Facility: CLINIC | Age: 63
End: 2018-03-30

## 2018-03-30 NOTE — TELEPHONE ENCOUNTER
Summary:    Patient is due/failing the following:   MAMMOGRAM    Action needed:   Schedule a mammogram     Type of outreach:    Sent letter.    Questions for provider review:    None                                                                                                                                    Dania Dorsey     Chart routed to Care Team .        Panel Management Review      Patient has the following on her problem list: None      Composite cancer screening  Chart review shows that this patient is due/due soon for the following Mammogram

## 2018-03-30 NOTE — LETTER
[address] [address]  Phone: [phone]  Fax: [phone]  March 30, 2018      Rach Dick  [address] [address] [place] [address]      Dear Rach,    We care about your health and have reviewed your health plan including your medical conditions, medications, and lab results.  Based on this review, it is recommended that you follow up regarding the following health topic(s):  -Breast Cancer Screening    We recommend you take the following action(s):  -schedule a MAMMOGRAM which is due. Please disregard this reminder if you have had this exam elsewhere within the last 1-2 years please let us know so we can update your records.     Please call us at [phone] (or use [employer]) to address the above recommendations.     Thank you for trusting [facility] and we appreciate the opportunity to serve you.  We look forward to supporting your healthcare needs in the future.    Healthy Regards,    Your Health Care Team  [facility]

## 2018-04-20 NOTE — ADDENDUM NOTE
Encounter addended by: Luis Alberto Mcgraw, PT on: 4/20/2018  9:07 AM<BR>     Actions taken: Sign clinical note, Episode resolved

## 2018-04-20 NOTE — PROGRESS NOTES
"Outpatient Physical Therapy Discharge Note     Patient: Rach Dick  : 1955    Beginning/End Dates of Reporting Period:  18 to 2018 (pt was not seen for any further PT since last progress report, 7 visits prior from  to 18)    Referring Provider: Jazmin Alfaro MD    Therapy Diagnosis:  left ankle pain, swelling, decreased ROM, weakness, imbalance, altered gait pattern, decreased functional level following left lateral malleolus fracture 10/20/17     Client Self Report: 2-3/10 L ankle pain on average, 5/10 on high. Has been trying to do more on her feet but then pain increases. Has been trying to do more lately ie: sweeping, scrubbing floor, photography but sx increase. Worse with weight bearing. Cane helps decrease pain 1 point with ambulation. Limps without using cane when reporting at last visit on 18.    Objective Measurements:18  Objective Measure: left ankle AROM DF, PF, inversion, eversion  Details: 13 AROM DF, 15 with assist, 60 degree, 30 degrees, 10 degrees  Objective Measure: left ankle MMT  Details: 5/5 DF, inversion and eversion, too painful to do single leg heel raise  Objective Measure: L SLS  Details: 1-2\" but limited due to pain  Objective Measure: LEFS (28/80 at eval) (38/80 oon 18)  Details: 32/80 today  Objective Measure: reported functional level (25% at eval on 18)  Details: better but still difficulty with prolonged wt bearing 30' or more      Goals:  Goal Identifier ROM   Goal Description pt will improve left ankle AROM to be near that of the right ankle to help normalize gait without assistive device   Target Date 18   Date Met      Progress:     Goal Identifier function   Goal Description pt will improve left ankle ROM and strength and carry over to improved funcitonal level as measured by improved LEFS score from 28/80 to 45/80 or higher    Target Date 18   Date Met      Progress:       Progress Toward Goals:   Not " assessed this period.  Pt did not schedule any further PT since last progress report.    Plan:  Discharge from therapy.    Discharge:    Reason for Discharge: Patient has failed to schedule further appointments.  Medicare G-code: Patient did not attend a final scheduled session prior to discharge. Unable to determine discharge functional status.    Equipment Issued: theraband    Discharge Plan: Patient to continue home program.

## 2018-04-27 NOTE — TELEPHONE ENCOUNTER
Spoke to patient and she will check insurance and call back to schedule if its covered.    Dania Dorsey

## 2018-07-19 ENCOUNTER — TELEPHONE (OUTPATIENT)
Dept: FAMILY MEDICINE | Facility: CLINIC | Age: 63
End: 2018-07-19

## 2018-07-19 NOTE — TELEPHONE ENCOUNTER
Summary:    Patient is due/failing the following:   MAMMOGRAM    Action needed:   Schedule a mammogram     Type of outreach:    Phone, left message for patient to call back.     Questions for provider review:    None                                                                                                                                    Dania Dorsey         Chart routed to Care Team .        Panel Management Review      Patient has the following on her problem list:     Hypertension   Last three blood pressure readings:  BP Readings from Last 3 Encounters:   09/07/17 102/62   06/29/16 118/80   06/08/16 110/60     Blood pressure: Passed    HTN Guidelines:  Age 18-59 BP range:  Less than 140/90  Age 60-85 with Diabetes:  Less than 140/90  Age 60-85 without Diabetes:  less than 150/90      Composite cancer screening  Chart review shows that this patient is due/due soon for the following Mammogram

## 2018-07-20 NOTE — TELEPHONE ENCOUNTER
Patient returned call and was given message below. Patient didn't want to schedule at this time.    Thank you Anni

## 2019-02-15 ENCOUNTER — HEALTH MAINTENANCE LETTER (OUTPATIENT)
Age: 64
End: 2019-02-15

## 2019-07-22 ENCOUNTER — OFFICE VISIT (OUTPATIENT)
Dept: FAMILY MEDICINE | Facility: CLINIC | Age: 64
End: 2019-07-22
Payer: COMMERCIAL

## 2019-07-22 VITALS
HEIGHT: 60 IN | DIASTOLIC BLOOD PRESSURE: 74 MMHG | OXYGEN SATURATION: 98 % | HEART RATE: 103 BPM | TEMPERATURE: 96.8 F | RESPIRATION RATE: 14 BRPM | SYSTOLIC BLOOD PRESSURE: 108 MMHG | BODY MASS INDEX: 26.38 KG/M2 | WEIGHT: 134.38 LBS

## 2019-07-22 DIAGNOSIS — Z12.31 ENCOUNTER FOR SCREENING MAMMOGRAM FOR BREAST CANCER: ICD-10-CM

## 2019-07-22 DIAGNOSIS — Z12.11 SPECIAL SCREENING FOR MALIGNANT NEOPLASMS, COLON: ICD-10-CM

## 2019-07-22 DIAGNOSIS — L08.9 LOCAL INFECTION OF SKIN AND SUBCUTANEOUS TISSUE: Primary | ICD-10-CM

## 2019-07-22 LAB
GRAM STN SPEC: NORMAL
Lab: NORMAL
SPECIMEN SOURCE: NORMAL

## 2019-07-22 PROCEDURE — 87205 SMEAR GRAM STAIN: CPT | Performed by: FAMILY MEDICINE

## 2019-07-22 PROCEDURE — 87070 CULTURE OTHR SPECIMN AEROBIC: CPT | Performed by: FAMILY MEDICINE

## 2019-07-22 PROCEDURE — 99213 OFFICE O/P EST LOW 20 MIN: CPT | Performed by: FAMILY MEDICINE

## 2019-07-22 RX ORDER — CEPHALEXIN 500 MG/1
500 CAPSULE ORAL 2 TIMES DAILY
Qty: 30 CAPSULE | Refills: 0 | Status: SHIPPED | OUTPATIENT
Start: 2019-07-22 | End: 2019-08-01

## 2019-07-22 ASSESSMENT — PAIN SCALES - GENERAL: PAINLEVEL: NO PAIN (0)

## 2019-07-22 ASSESSMENT — MIFFLIN-ST. JEOR: SCORE: 1074.43

## 2019-07-22 NOTE — PROGRESS NOTES
Subjective     Rach Dick is a 64 year old female who presents to clinic today for the following health issues:    HPI   Rash  Onset: x5-6d    Description:   Location: rt foot- top  Character: linear, raised, red  Itching (Pruritis): no     Progression of Symptoms:  worsening    Accompanying Signs & Symptoms:  Fever: no   Body aches or joint pain: no   Sore throat symptoms: no   Recent cold symptoms: no     History:   Previous similar rash: no     Precipitating factors:   Exposure to similar rash: no   New exposures: public shower   Recent travel: YES- [place]    Alleviating factors:  none    Therapies Tried and outcome: bacitracin and tea tree oil- not helping    She was in [place] hiking and camping with the youth from the [employer] in town here.  She noticed about 5 or 6 days ago that she developed a rash on the dorsum of her right foot and this progressed into some pustular lesions.  She never went barefoot, she was always having her feet and socks and shoes.  She did not have any bug bites in that area of foot or anywhere else on her body.  She said the bugs were very minimal.  She does not have any other symptoms including pruritus, burning or tingling, tenderness or discomfort at all, fever or anything else.      Patient Active Problem List   Diagnosis     Essential and other specified forms of tremor     Irritable bowel syndrome     Hypoglycemia     Hip pain     Lumbar radiculopathy     Bunion     Fibrocystic breast changes     CARDIOVASCULAR SCREENING; LDL GOAL LESS THAN 160     Osteopenia     Health Care Home     Advanced directives, counseling/discussion     Pain in shoulder     RSD (reflex sympathetic dystrophy)     Trochanteric bursitis     H/O: hysterectomy     Adenomatous polyp of colon     Closed nondisplaced fracture of lateral malleolus of left fibula with routine healing, subsequent encounter     Past Surgical History:   Procedure Laterality Date     C NONSPECIFIC PROCEDURE      knee  surgery     C NONSPECIFIC PROCEDURE  1993    lithotripsy     C OPEN RX DIST RAD/ULNA FX  12/06/2004    ORIF right distal radius fx     C TOTAL ABDOM HYSTERECTOMY      Hysterectomy, Total Abdominal removed one ovary     COLONOSCOPY  10/27/08     HC COLONOSCOPY W/WO BRUSH/WASH  09/12/05     HC CYSTOURETHROSCOPY  10/18/2004    Vaginal biopsy with cystoscopic guidance.     HC EXCISION BREAST LESION W XRAY MARKER, OPEN SINGLE  4/10/2007    Left      HC REMOVAL GALLBLADDER  1991    Cholecystectomy       Social History     Tobacco Use     Smoking status: Never Smoker     Smokeless tobacco: Never Used     Tobacco comment: no smokers in the household   Substance Use Topics     Alcohol use: No     Family History   Problem Relation Age of Onset     Cancer Other      Diabetes Other      Diabetes Other      Breast Cancer Other         ? breast - Pat GM     Psychotic Disorder Mother         bi-polar     Osteoporosis Mother      Neurologic Disorder Father         parkinsons     Heart Disease Father         heart murmur     Psychotic Disorder Sister         both sisters bi-polar         Allergies   Allergen Reactions     Sulfa Drugs Hives     Few hives on arm     Droperidol Other (See Comments)     Lock jaw     Prochlorperazine Other (See Comments)     Compazine: Lock jaw     Recent Labs   Lab Test 06/07/12  1018 03/23/12  0813 05/26/11  1554   A1C  --  5.6  --    LDL 44  --   --    HDL 61  --   --    TRIG 37  --   --    TSH  --   --  1.04      BP Readings from Last 3 Encounters:   07/22/19 108/74   09/07/17 102/62   06/29/16 118/80    Wt Readings from Last 3 Encounters:   07/22/19 61 kg (134 lb 6 oz)   02/21/18 61.2 kg (135 lb)   01/10/18 62.6 kg (138 lb)                    Reviewed and updated as needed this visit by Provider         Review of Systems   ROS COMP: Constitutional, HEENT, cardiovascular, pulmonary, gi and gu systems are negative, except as otherwise noted.      Objective    /74   Pulse 103   Temp 96.8  F (36  " C) (Tympanic)   Resp 14   Ht 1.521 m (4' 11.9\")   Wt 61 kg (134 lb 6 oz)   SpO2 98%   BMI 26.33 kg/m    Body mass index is 26.33 kg/m .  Physical Exam   GENERAL: healthy, alert and no distress  SKIN: On the dorsum of her right foot just proximal to the MP joint of the third and fourth toes she is got a collection of 4 pustular lesions with the largest measuring 5 x 8 mm and the smallest about 3 x 3 mm in diameter.  There is minimal erythema around the base of each of the lesions but the skin surrounding it is clear.  She is got no edema no tenderness.  She does deny burning or tenderness.    Diagnostic Test Results:  Labs reviewed in Epic  A Gram stain and culture was obtained after a broke open the pustular lesions.        Assessment & Plan     (L08.9) Local infection of skin and subcutaneous tissue  (primary encounter diagnosis)  Comment: For pustular lesions of the dorsum of the right foot that developed after a rash started in that area when she was out camping and hiking in [place].  Plan: cephALEXin (KEFLEX) 500 MG capsule, Wound         Culture Aerobic Bacterial, Gram stain        We will await the culture and Gram stain but in the meantime we will cover her with Keflex 500 mg twice daily x10 days.       BMI:   Estimated body mass index is 26.33 kg/m  as calculated from the following:    Height as of this encounter: 1.521 m (4' 11.9\").    Weight as of this encounter: 61 kg (134 lb 6 oz).   Weight management plan: Not addressed today.      Electronically signed by:  Jose Manuel Hudson M.D.  7/22/2019    "

## 2019-07-24 LAB
BACTERIA SPEC CULT: NO GROWTH
Lab: NORMAL
SPECIMEN SOURCE: NORMAL

## 2019-08-08 ENCOUNTER — OFFICE VISIT (OUTPATIENT)
Dept: FAMILY MEDICINE | Facility: CLINIC | Age: 64
End: 2019-08-08
Payer: COMMERCIAL

## 2019-08-08 ENCOUNTER — HOSPITAL ENCOUNTER (OUTPATIENT)
Dept: MAMMOGRAPHY | Facility: CLINIC | Age: 64
Discharge: HOME | End: 2019-08-08
Attending: FAMILY MEDICINE | Admitting: FAMILY MEDICINE
Payer: COMMERCIAL

## 2019-08-08 VITALS
HEIGHT: 60 IN | BODY MASS INDEX: 26.66 KG/M2 | SYSTOLIC BLOOD PRESSURE: 100 MMHG | TEMPERATURE: 97.2 F | RESPIRATION RATE: 18 BRPM | WEIGHT: 135.8 LBS | DIASTOLIC BLOOD PRESSURE: 68 MMHG | HEART RATE: 101 BPM | OXYGEN SATURATION: 99 %

## 2019-08-08 DIAGNOSIS — Z12.31 VISIT FOR SCREENING MAMMOGRAM: ICD-10-CM

## 2019-08-08 DIAGNOSIS — S82.842S CLOSED BIMALLEOLAR FRACTURE OF LEFT ANKLE, SEQUELA: ICD-10-CM

## 2019-08-08 DIAGNOSIS — Z12.31 ENCOUNTER FOR SCREENING MAMMOGRAM FOR BREAST CANCER: ICD-10-CM

## 2019-08-08 DIAGNOSIS — Z13.6 CARDIOVASCULAR SCREENING; LDL GOAL LESS THAN 160: ICD-10-CM

## 2019-08-08 DIAGNOSIS — Z00.00 ENCOUNTER FOR MEDICARE ANNUAL WELLNESS EXAM: Primary | ICD-10-CM

## 2019-08-08 DIAGNOSIS — E53.8 VITAMIN B12 DEFICIENCY (NON ANEMIC): ICD-10-CM

## 2019-08-08 LAB
ALBUMIN SERPL-MCNC: 4.1 G/DL (ref 3.4–5)
ALP SERPL-CCNC: 93 U/L (ref 40–150)
ALT SERPL W P-5'-P-CCNC: 67 U/L (ref 0–50)
ANION GAP SERPL CALCULATED.3IONS-SCNC: 6 MMOL/L (ref 3–14)
AST SERPL W P-5'-P-CCNC: 33 U/L (ref 0–45)
BILIRUB SERPL-MCNC: 0.5 MG/DL (ref 0.2–1.3)
BUN SERPL-MCNC: 20 MG/DL (ref 7–30)
CALCIUM SERPL-MCNC: 9 MG/DL (ref 8.5–10.1)
CHLORIDE SERPL-SCNC: 108 MMOL/L (ref 94–109)
CHOLEST SERPL-MCNC: 102 MG/DL
CO2 SERPL-SCNC: 29 MMOL/L (ref 20–32)
CREAT SERPL-MCNC: 0.91 MG/DL (ref 0.52–1.04)
ERYTHROCYTE [DISTWIDTH] IN BLOOD BY AUTOMATED COUNT: 13.4 % (ref 10–15)
GFR SERPL CREATININE-BSD FRML MDRD: 66 ML/MIN/{1.73_M2}
GLUCOSE SERPL-MCNC: 104 MG/DL (ref 70–99)
HCT VFR BLD AUTO: 47.5 % (ref 35–47)
HDLC SERPL-MCNC: 54 MG/DL
HGB BLD-MCNC: 15.9 G/DL (ref 11.7–15.7)
LDLC SERPL CALC-MCNC: 29 MG/DL
MCH RBC QN AUTO: 31.3 PG (ref 26.5–33)
MCHC RBC AUTO-ENTMCNC: 33.5 G/DL (ref 31.5–36.5)
MCV RBC AUTO: 94 FL (ref 78–100)
NONHDLC SERPL-MCNC: 48 MG/DL
PLATELET # BLD AUTO: 160 10E9/L (ref 150–450)
POTASSIUM SERPL-SCNC: 4.2 MMOL/L (ref 3.4–5.3)
PROT SERPL-MCNC: 7.6 G/DL (ref 6.8–8.8)
RBC # BLD AUTO: 5.08 10E12/L (ref 3.8–5.2)
SODIUM SERPL-SCNC: 143 MMOL/L (ref 133–144)
TRIGL SERPL-MCNC: 96 MG/DL
VIT B12 SERPL-MCNC: 1025 PG/ML (ref 193–986)
WBC # BLD AUTO: 5.3 10E9/L (ref 4–11)

## 2019-08-08 PROCEDURE — 85027 COMPLETE CBC AUTOMATED: CPT | Performed by: FAMILY MEDICINE

## 2019-08-08 PROCEDURE — 80053 COMPREHEN METABOLIC PANEL: CPT | Performed by: FAMILY MEDICINE

## 2019-08-08 PROCEDURE — 36415 COLL VENOUS BLD VENIPUNCTURE: CPT | Performed by: FAMILY MEDICINE

## 2019-08-08 PROCEDURE — 80061 LIPID PANEL: CPT | Performed by: FAMILY MEDICINE

## 2019-08-08 PROCEDURE — 82607 VITAMIN B-12: CPT | Performed by: FAMILY MEDICINE

## 2019-08-08 PROCEDURE — 77063 BREAST TOMOSYNTHESIS BI: CPT

## 2019-08-08 PROCEDURE — G0402 INITIAL PREVENTIVE EXAM: HCPCS | Performed by: FAMILY MEDICINE

## 2019-08-08 RX ORDER — CHROMIUM PICOLINATE 200 MCG
200 TABLET ORAL DAILY
COMMUNITY

## 2019-08-08 ASSESSMENT — ENCOUNTER SYMPTOMS
SORE THROAT: 0
ARTHRALGIAS: 0
PARESTHESIAS: 0
JOINT SWELLING: 0
EYE PAIN: 0
ABDOMINAL PAIN: 0
CONSTIPATION: 0
HEMATURIA: 0
COUGH: 0
HEMATOCHEZIA: 0
DIZZINESS: 1
NERVOUS/ANXIOUS: 0
HEADACHES: 0
CHILLS: 0
BREAST MASS: 0
SHORTNESS OF BREATH: 0
DYSURIA: 0
NAUSEA: 0
PALPITATIONS: 0
TROUBLE SWALLOWING: 1
FREQUENCY: 0
DIARRHEA: 0
HEARTBURN: 0
WEAKNESS: 0
FEVER: 0
MYALGIAS: 0

## 2019-08-08 ASSESSMENT — MIFFLIN-ST. JEOR: SCORE: 1080.89

## 2019-08-08 ASSESSMENT — ACTIVITIES OF DAILY LIVING (ADL): CURRENT_FUNCTION: NO ASSISTANCE NEEDED

## 2019-08-08 NOTE — PROGRESS NOTES
"SUBJECTIVE:   Rach Dick is a 64 year old female who presents for Preventive Visit.  Are you in the first 12 months of your Medicare coverage?  Yes,  Visual Acuity:  Right Eye: 20/25   Left Eye:20/20 Both Eyes: 20/20      Left ankle cracks and is still sore after a full day of walking. Patient broke that ankle in 2017.               Healthy Habits:     In general, how would you rate your overall health?  Good    Frequency of exercise:  2-3 days/week    Duration of exercise:  Less than 15 minutes    Do you usually eat at least 4 servings of fruit and vegetables a day, include whole grains    & fiber and avoid regularly eating high fat or \"junk\" foods?  Yes    Taking medications regularly:  Yes    Medication side effects:  None    Ability to successfully perform activities of daily living:  No assistance needed    Home Safety:  No safety concerns identified    Hearing Impairment:  Difficulty following a conversation in a noisy restaurant or crowded room    In the past 6 months, have you been bothered by leaking of urine? Yes    In general, how would you rate your overall mental or emotional health?  Good      PHQ-2 Total Score: 0    Additional concerns today:  No    Do you feel safe in your environment? Yes    Do you have a Health Care Directive? No: Advance care planning was reviewed with patient; patient declined at this time.      Fall risk  Fallen 2 or more times in the past year?: No  Any fall with injury in the past year?: No  click delete button to remove this line now  Cognitive Screening   1) Repeat 3 items (Leader, Season, Table)    2) Clock draw: NORMAL  3) 3 item recall: Recalls 3 objects  Results: 3 items recalled: COGNITIVE IMPAIRMENT LESS LIKELY    Mini-CogTM [employer]. Licensed by the author for use in [facility]; reprinted with permission (elmer@.[facility]). All rights reserved.      Do you have sleep apnea, excessive snoring or daytime drowsiness?: no    Reviewed and updated " as needed this visit by clinical staff  Tobacco  Allergies  Meds  Med Hx  Surg Hx  Fam Hx  Soc Hx        Reviewed and updated as needed this visit by Provider        Social History     Tobacco Use     Smoking status: Never Smoker     Smokeless tobacco: Never Used     Tobacco comment: no smokers in the household   Substance Use Topics     Alcohol use: No     If you drink alcohol do you typically have >3 drinks per day or >7 drinks per week? Not applicable    Alcohol Use 8/8/2019   Prescreen: >3 drinks/day or >7 drinks/week? No   Prescreen: >3 drinks/day or >7 drinks/week? -   No flowsheet data found.            Current providers sharing in care for this patient include:   Patient Care Team:  No Ref-Primary, Physician as PCP - Jose Manuel Walker MD as Assigned PCP    The following health maintenance items are reviewed in Epic and correct as of today:  Health Maintenance   Topic Date Due     HEPATITIS C SCREENING  1955     HIV SCREENING  01/28/1969     ZOSTER IMMUNIZATION (1 of 2) 01/28/2004     ADVANCE CARE PLANNING  10/31/2016     LIPID  06/07/2017     MAMMO SCREENING  11/16/2017     COLONOSCOPY  11/25/2018     MEDICARE ANNUAL WELLNESS VISIT  01/28/2019     INFLUENZA VACCINE (1) 09/01/2019     FALL RISK ASSESSMENT  07/22/2020     DTAP/TDAP/TD IMMUNIZATION (4 - Td) 07/25/2023     DEXA  Completed     PHQ-2  Completed     IPV IMMUNIZATION  Aged Out     MENINGITIS IMMUNIZATION  Aged Out       Mammogram Screening: Mammogram Screening: Patient over age 50, mutual decision to screen reflected in health maintenance.    Review of Systems   Constitutional: Negative for chills and fever.   HENT: Positive for congestion and trouble swallowing. Negative for ear pain, hearing loss and sore throat.    Eyes: Negative for pain and visual disturbance.   Respiratory: Negative for cough and shortness of breath.    Cardiovascular: Positive for peripheral edema. Negative for chest pain and palpitations.  "  Gastrointestinal: Negative for abdominal pain, constipation, diarrhea, heartburn, hematochezia and nausea.   Breasts:  Positive for tenderness. Negative for breast mass and discharge.   Genitourinary: Negative for dysuria, frequency, genital sores, hematuria, pelvic pain, urgency, vaginal bleeding and vaginal discharge.   Musculoskeletal: Negative for arthralgias, joint swelling and myalgias.   Skin: Negative for rash.   Neurological: Positive for dizziness. Negative for weakness, headaches and paresthesias.   Psychiatric/Behavioral: Negative for mood changes. The patient is not nervous/anxious.          OBJECTIVE:   /68 (BP Location: Left arm, Patient Position: Sitting, Cuff Size: Adult Large)   Pulse 101   Temp 97.2  F (36.2  C) (Temporal)   Resp 18   Ht 1.521 m (4' 11.9\")   Wt 61.6 kg (135 lb 12.8 oz)   SpO2 99%   BMI 26.61 kg/m   Estimated body mass index is 26.61 kg/m  as calculated from the following:    Height as of this encounter: 1.521 m (4' 11.9\").    Weight as of this encounter: 61.6 kg (135 lb 12.8 oz).  Physical Exam  GENERAL: healthy, alert and no distress  EYES: Eyes grossly normal to inspection, PERRL and conjunctivae and sclerae normal  HENT: ear canals and TM's normal, nose and mouth without ulcers or lesions  NECK: no adenopathy, no asymmetry, masses, or scars and thyroid normal to palpation  RESP: lungs clear to auscultation - no rales, rhonchi or wheezes  CV: regular rate and rhythm, normal S1 S2, no S3 or S4, no murmur, click or rub, no peripheral edema and peripheral pulses strong  ABDOMEN: soft, nontender, no hepatosplenomegaly, no masses and bowel sounds normal  MS: no gross musculoskeletal defects noted, no edema  SKIN: no suspicious lesions or rashes  NEURO: Normal strength and tone, mentation intact and speech normal  PSYCH: mentation appears normal, affect normal/bright    Diagnostic Test Results:  Labs reviewed in Epic  Results for orders placed or performed in visit on " 08/08/19 (from the past 24 hour(s))   Lipid Profile   Result Value Ref Range    Cholesterol 102 <200 mg/dL    Triglycerides 96 <150 mg/dL    HDL Cholesterol 54 >49 mg/dL    LDL Cholesterol Calculated 29 <100 mg/dL    Non HDL Cholesterol 48 <130 mg/dL   Comprehensive metabolic panel   Result Value Ref Range    Sodium 143 133 - 144 mmol/L    Potassium 4.2 3.4 - 5.3 mmol/L    Chloride 108 94 - 109 mmol/L    Carbon Dioxide 29 20 - 32 mmol/L    Anion Gap 6 3 - 14 mmol/L    Glucose 104 (H) 70 - 99 mg/dL    Urea Nitrogen 20 7 - 30 mg/dL    Creatinine 0.91 0.52 - 1.04 mg/dL    GFR Estimate 66 >60 mL/min/[1.73_m2]    GFR Estimate If Black 76 >60 mL/min/[1.73_m2]    Calcium 9.0 8.5 - 10.1 mg/dL    Bilirubin Total 0.5 0.2 - 1.3 mg/dL    Albumin 4.1 3.4 - 5.0 g/dL    Protein Total 7.6 6.8 - 8.8 g/dL    Alkaline Phosphatase 93 40 - 150 U/L    ALT 67 (H) 0 - 50 U/L    AST 33 0 - 45 U/L   CBC with platelets   Result Value Ref Range    WBC 5.3 4.0 - 11.0 10e9/L    RBC Count 5.08 3.8 - 5.2 10e12/L    Hemoglobin 15.9 (H) 11.7 - 15.7 g/dL    Hematocrit 47.5 (H) 35.0 - 47.0 %    MCV 94 78 - 100 fl    MCH 31.3 26.5 - 33.0 pg    MCHC 33.5 31.5 - 36.5 g/dL    RDW 13.4 10.0 - 15.0 %    Platelet Count 160 150 - 450 10e9/L       ASSESSMENT / PLAN:   1. Encounter for Medicare annual wellness exam  Generally healthy.    2. Closed bimalleolar fracture of left ankle, sequela  Still having trouble with her ankle clicks and hurts and swells sometimes after being on it.  - PODIATRY/FOOT & ANKLE SURGERY REFERRAL    3. Vitamin B12 deficiency (non anemic)  Her daughter was discovered to have this and was recommended she get checked  - CBC with platelets  - Vitamin B12    4. CARDIOVASCULAR SCREENING; LDL GOAL LESS THAN 160  Notify with results.  - Lipid Profile  - Comprehensive metabolic panel    End of Life Planning:  Patient currently has an advanced directive: Yes.  Practitioner is supportive of decision.    COUNSELING:  Reviewed preventive health  "counseling, as reflected in patient instructions       Regular exercise       Healthy diet/nutrition       Vision screening    Estimated body mass index is 26.61 kg/m  as calculated from the following:    Height as of this encounter: 1.521 m (4' 11.9\").    Weight as of this encounter: 61.6 kg (135 lb 12.8 oz).         reports that she has never smoked. She has never used smokeless tobacco.      Appropriate preventive services were discussed with this patient, including applicable screening as appropriate for cardiovascular disease, diabetes, osteopenia/osteoporosis, and glaucoma.  As appropriate for age/gender, discussed screening for colorectal cancer, prostate cancer, breast cancer, and cervical cancer. Checklist reviewing preventive services available has been given to the patient.    Reviewed patients plan of care and provided an AVS. The Basic Care Plan (routine screening as documented in Health Maintenance) for Rach meets the Care Plan requirement. This Care Plan has been established and reviewed with the Patient.    Counseling Resources:  ATP IV Guidelines  Pooled Cohorts Equation Calculator  Breast Cancer Risk Calculator  FRAX Risk Assessment  ICSI Preventive Guidelines  Dietary Guidelines for Americans, 2010  USDA's MyPlate  ASA Prophylaxis  Lung CA Screening    Wander Patel MD  [facility]    Identified Health Risks:  "

## 2019-08-08 NOTE — PATIENT INSTRUCTIONS
Patient Education   Personalized Prevention Plan  You are due for the preventive services outlined below.  Your care team is available to assist you in scheduling these services.  If you have already completed any of these items, please share that information with your care team to update in your medical record.  Health Maintenance Due   Topic Date Due     Hepatitis C Screening  1955     HIV Screening  01/28/1969     Zoster (Shingles) Vaccine (1 of 2) 01/28/2004     Discuss Advance Care Planning  10/31/2016     Cholesterol Lab  06/07/2017     Mammogram  11/16/2017     Colonscopy  11/25/2018     Annual Wellness Visit  01/28/2019       Signs of Hearing Loss     Hearing much better with one ear can be a sign of hearing loss.     Hearing loss is a problem shared by many people. In fact, it is one of the most common health conditions, particularly as people age. Most people over age 65 have some hearing loss, and by age 80, almost everyone does. Because hearing loss usually occurs slowly over the years, you may not realize your hearing ability has gotten worse.  Have your hearing checked  Contact your healthcare provider if you:    Have to strain to hear normal conversation    Have to watch other people s faces very carefully to follow what they re saying    Need to ask people to repeat what they ve said    Often misunderstand what people are saying    Turn the volume of the television or radio up so high that others complain    Feel that people are mumbling when they re talking to you    Find that the effort to hear leaves you feeling tired and irritated    Notice, when using the phone, that you hear better with one ear than the other  Date Last Reviewed: 12/1/2016 2000-2018 [employer]. [address] [place] [address], [place] [address]. All rights reserved. This information is not intended as a substitute for professional medical care. Always follow your healthcare professional's  instructions.          Urinary Incontinence, Female (Adult)  Urinary incontinence means loss of control of the bladder. This problem affects many women, especially as they get older. If you have incontinence, you may be embarrassed to ask for help. But know that this problem can be treated.  Types of Incontinence  There are different types of incontinence. Two of the main types are described here. You can have more than one type.    Stress incontinence. With this type, urine leaks when pressure (stress) is put on the bladder. This may happen when you cough, sneeze, or laugh. Stress incontinence most often occurs because the pelvic floor muscles that support the bladder and urethra are weak. This can happen after pregnancy and vaginal childbirth or a hysterectomy. It can also be due to excess body weight or hormone changes.    Urge incontinence (also called overactive bladder). With this type, a sudden urge to urinate is felt often. This may happen even though there may not be much urine in the bladder. The need to urinate often during the night is common. Urge incontinence most often occurs because of bladder spasms. This may be due to bladder irritation or infection. Damage to bladder nerves or pelvic muscles, constipation, and certain medicines can also lead to urge incontinence.  Treatment of urinary incontinence depends on the cause. Further evaluation is needed to find the type you have. This will likely include an exam and certain tests. Based on the results, you and your healthcare provider can then plan treatment. Until a diagnosis is made, the home care tips below can help relieve symptoms.  Home care    Do pelvic floor muscle exercises, if they are prescribed. The pelvic floor muscles help support the bladder and urethra. Many women find that their symptoms improve when doing special exercises that strengthen these muscles. To do the exercises contract the muscles you would use to stop your stream of  urine, but do this when you re not urinating. Hold for 10 seconds, then relax. Repeat 10 to 20 times in a row, at least 3 times a day. Your provider may give you other instructions for how to do the exercises and how often.    Keep a bladder diary. This helps track how often and how much you urinate over a set period of time. Bring this diary with you to your next visit with the provider. The information can help your provider learn more about your bladder problem.    Lose weight, if advised to by your provider. Excess weight puts pressure on the bladder. Your provider can help you create a weight-loss plan that s right for you. This may include exercising more and making certain diet changes.    Don't consume foods and drinks that may irritate the bladder. These can include alcohol and caffeinated drinks.    Quit smoking. Smoking and other tobacco use can lead to chronic cough that strains the pelvic floor muscles. Smoking may also damage the bladder and urethra. Talk with your provider about treatments or methods you can use to quit smoking.    If drinking large amounts of fluid causes you to have symptoms, you may be advised to limit your fluid intake. You may also be advised to drink most of your fluids during the day and to limit fluids at night.    If you re worried about urine leakage or accidents, you may wear absorbent pads to catch urine. Change the pads often. This helps reduce discomfort. It may also reduce the risk of skin or bladder infections.  Follow-up care  Follow up with your healthcare provider, or as directed. It may take some to find the right treatment for your problem. Your treatment plan may include special therapies or medicines. Certain procedures or surgery may also be options. Be sure to discuss any questions you have with your provider.  When to seek medical advice  Call the healthcare provider right away if any of these occur:    Fever of 100.4 F (38 C) or higher, or as directed by  your provider    Bladder pain or fullness    Abdominal swelling    Nausea or vomiting    Back pain    Weakness, dizziness or fainting  Date Last Reviewed: 10/1/2017    [id number] [employer]. [address] [place] [address], [place] [address]. All rights reserved. This information is not intended as a substitute for professional medical care. Always follow your healthcare professional's instructions.

## 2019-08-08 NOTE — PROGRESS NOTES
The patient was provided with written information regarding signs of hearing loss.  Information on urinary incontinence and treatment options given to patient.

## 2019-08-09 ENCOUNTER — TELEPHONE (OUTPATIENT)
Dept: FAMILY MEDICINE | Facility: CLINIC | Age: 64
End: 2019-08-09

## 2019-08-09 NOTE — RESULT ENCOUNTER NOTE
Vitamin B12 level is actually above the normal range.  Cholesterol is good at 102 chemistry panel is okay your blood sugar is borderline at 104 and was borderline elevation of 1 of the liver function test.  Could be from Tylenol use.  Hemoglobin was on the high side.

## 2019-08-09 NOTE — TELEPHONE ENCOUNTER
Patient was informed that her Vitamin B12 is actually above the normal range. Cholesterol is good at 102. Chemistry panel is okay and your blood sugar is borderline at 104. Your liver function test was borderline elevation of 1. Could be from tylenol use. Hemoglobin was on the high side. Patient states that she has not used tylenol.    Sindi Lopez, CMA

## 2019-08-09 NOTE — TELEPHONE ENCOUNTER
----- Message from Wander Patel MD sent at 8/9/2019 11:21 AM CDT -----  Vitamin B12 level is actually above the normal range.  Cholesterol is good at 102 chemistry panel is okay your blood sugar is borderline at 104 and was borderline elevation of 1 of the liver function test.  Could be from Tylenol use.  Hemoglobin was on the high side.

## 2019-08-14 ENCOUNTER — ANCILLARY PROCEDURE (OUTPATIENT)
Dept: GENERAL RADIOLOGY | Facility: CLINIC | Age: 64
End: 2019-08-14
Attending: PODIATRIST
Payer: COMMERCIAL

## 2019-08-14 ENCOUNTER — OFFICE VISIT (OUTPATIENT)
Dept: PODIATRY | Facility: CLINIC | Age: 64
End: 2019-08-14
Payer: COMMERCIAL

## 2019-08-14 VITALS
HEIGHT: 60 IN | SYSTOLIC BLOOD PRESSURE: 110 MMHG | DIASTOLIC BLOOD PRESSURE: 82 MMHG | BODY MASS INDEX: 26.66 KG/M2 | WEIGHT: 135.8 LBS

## 2019-08-14 DIAGNOSIS — M12.572 TRAUMATIC ARTHRITIS OF ANKLE, LEFT: Primary | ICD-10-CM

## 2019-08-14 DIAGNOSIS — Z87.81 HISTORY OF FRACTURE OF LEFT ANKLE: ICD-10-CM

## 2019-08-14 DIAGNOSIS — S82.65XD CLOSED NONDISPLACED FRACTURE OF LATERAL MALLEOLUS OF LEFT FIBULA WITH ROUTINE HEALING, SUBSEQUENT ENCOUNTER: ICD-10-CM

## 2019-08-14 PROCEDURE — 73610 X-RAY EXAM OF ANKLE: CPT | Mod: TC

## 2019-08-14 PROCEDURE — 99203 OFFICE O/P NEW LOW 30 MIN: CPT | Performed by: PODIATRIST

## 2019-08-14 ASSESSMENT — PAIN SCALES - GENERAL: PAINLEVEL: MILD PAIN (3)

## 2019-08-14 ASSESSMENT — MIFFLIN-ST. JEOR: SCORE: 1080.89

## 2019-08-14 NOTE — PATIENT INSTRUCTIONS
Reliable shoe stores: To maximize your experience and provide the best possible fit.  Be sure to show them your foot concerns and tell them Dr. Reed sent you.      Stores listed in bold have only athletic shoes, and stores that are not bold are mostly casual or variety of shoes    [employer]  [address] [place] [address]  [phone]    [facility] [employer] [facility]  [address] [place] [address]  [phone]    [facility] [employer] [facility]  [address] [place] [address]  [phone]    [employer]  [address]  [facility] [address]  [phone]    [employer]  [address] [place], [place] [address] [phone]    [employer]  [address] [place] [address]  [phone]                         [employer] Locations:     [address] [place] [address]   [phone]     [address] [place] [address] [address] [place] [address]   [phone]     [address] [place] [address]   [phone]     [address] [facility] [place] [address]   [phone]     [address] [place] [address]   [phone]     [address] [place] [address]   [phone]     [address] [facility] [address]   [place] [address]   [phone]     [address] [facility]. [address]   [place] [address]   [phone]             How to find reasonable shoes          The correct width    Correct Fitting    Correct Length      Foot Distortion    Posture Distortion                          Torsional Rigidity      Grasp behind the heel and underneath the foot and twist      Bad    Excessive torsion/twist in midfoot     Less torsion/twist in midfoot is better                   Heel Counter Rigidity      Grasp just above   midsole and squeeze      Bad    Soft heel counter      Good    Rigid Heel Counter      Flexion Rigidity      Grasp shoe and bend from forefoot to rearfoot              Tri Lock ankle brace is a reliable and sturdy  ankle brace. A figure of 8 ankle brace, Procare double strap ankle support or lace up ankle gauntlet or similar brand are most readily available on line, [employer], or [employer] delivered for around $20-40.  Health insurance may not pay for these.

## 2019-08-14 NOTE — LETTER
8/14/2019         RE: Rach Dick  [address] [facility] [address]        Dear Colleague,    Thank you for referring your patient, Rach Dick, to the [facility]. Please see a copy of my visit note below.    HPI:  Rach Dick is a 64 year old female who is seen in consultation at the request of Wander Patel MD    Pt presents for eval of:   (Onset, Location, L/R, Character, Treatments, Injury if yes)    XR Left ankle today, 8/14/2019     Onset 10/20/2017 - Closed nondisplaced fracture of lateral malleolus of left fibula. Presents today with lateral and posterior Left ankle pain and slip on casual shoes.  Constant, dull ache Intermittent, swelling, sharp, stabbing, throbbing pain 3-7 w/certain ROM  No current treatment    Retired.    Weight management plan: Patient was referred to their PCP to discuss a diet and exercise plan.     Patient to follow up with Primary Care provider regarding elevated blood pressure.    ROS:  10 point ROS neg other than the symptoms noted above in the HPI.    Patient Active Problem List   Diagnosis     Essential and other specified forms of tremor     Irritable bowel syndrome     Hypoglycemia     Hip pain     Lumbar radiculopathy     Bunion     Fibrocystic breast changes     CARDIOVASCULAR SCREENING; LDL GOAL LESS THAN 160     Osteopenia     Health Care Home     Advanced directives, counseling/discussion     Pain in shoulder     RSD (reflex sympathetic dystrophy)     Trochanteric bursitis     H/O: hysterectomy     Adenomatous polyp of colon     Closed nondisplaced fracture of lateral malleolus of left fibula with routine healing, subsequent encounter     Closed bimalleolar fracture of left ankle, sequela     Traumatic arthritis of ankle, left     History of fracture of left ankle       PAST MEDICAL HISTORY:   Past Medical History:   Diagnosis Date     Adenomatous polyp of colon 2013    q 5 yr colonoscopy recommended     Calculus of kidney      recurrent     H/O: hysterectomy     PAPS NO LONGER INDICATED     Hemangioma NOS 2008    ? diagnosis liver     Lumbago      Lumbar radiculopathy 11/17/2008    L5 nerve impingement - see MRI     Mild intermittent asthma 7/12/2001     Nausea with vomiting      Unspecified gastritis and gastroduodenitis without mention of hemorrhage         PAST SURGICAL HISTORY:   Past Surgical History:   Procedure Laterality Date     C NONSPECIFIC PROCEDURE      knee surgery     C NONSPECIFIC PROCEDURE  1993    lithotripsy     C OPEN RX DIST RAD/ULNA FX  12/06/2004    ORIF right distal radius fx     C TOTAL ABDOM HYSTERECTOMY      Hysterectomy, Total Abdominal removed one ovary     COLONOSCOPY  10/27/08     HC COLONOSCOPY W/WO BRUSH/WASH  09/12/05     HC CYSTOURETHROSCOPY  10/18/2004    Vaginal biopsy with cystoscopic guidance.     HC EXCISION BREAST LESION W XRAY MARKER, OPEN SINGLE  4/10/2007    Left      HC REMOVAL GALLBLADDER  1991    Cholecystectomy        MEDICATIONS:   Current Outpatient Medications:      CALCIUM + D OR, 2 TABLET ONCE A DAY, Disp: , Rfl:      cetirizine (ZYRTEC ALLERGY) 10 MG tablet, Take 10 mg by mouth daily, Disp: , Rfl:      Chromium 200 MCG TABS tablet, Take 200 mcg by mouth daily, Disp: , Rfl:      MULTIVITAMIN OR, TAKE ONE TABLET DAILY., Disp: , Rfl:      ALLERGIES:    Allergies   Allergen Reactions     Sulfa Drugs Hives     Few hives on arm     Droperidol Other (See Comments)     Lock jaw     Prochlorperazine Other (See Comments)     Compazine: Lock jaw        SOCIAL HISTORY:   Social History     Socioeconomic History     Marital status:      Spouse name: Chuck     Number of children: 4     Years of education: 15     Highest education level: Not on file   Occupational History     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[place]: [employer]   Social Needs     Financial resource strain: Not on file     Food insecurity:     Worry: Not on file     Inability: Not on file      Transportation needs:     Medical: Not on file     Non-medical: Not on file   Tobacco Use     Smoking status: Never Smoker     Smokeless tobacco: Never Used     Tobacco comment: no smokers in the household   Substance and Sexual Activity     Alcohol use: No     Drug use: No     Sexual activity: Not Currently     Partners: Male     Birth control/protection: Surgical     Comment: Hysterectomy   Lifestyle     Physical activity:     Days per week: Not on file     Minutes per session: Not on file     Stress: Not on file   Relationships     Social connections:     Talks on phone: Not on file     Gets together: Not on file     Attends Protestant service: Not on file     Active member of club or organization: Not on file     Attends meetings of clubs or organizations: Not on file     Relationship status: Not on file     Intimate partner violence:     Fear of current or ex partner: Not on file     Emotionally abused: Not on file     Physically abused: Not on file     Forced sexual activity: Not on file   Other Topics Concern      Service No     Blood Transfusions No     Caffeine Concern No     Occupational Exposure No     Hobby Hazards No     Sleep Concern No     Stress Concern No     Weight Concern No     Special Diet No     Back Care Yes     Comment: Hx; back pain     Exercise No     Bike Helmet Not Asked     Comment: N/A     Seat Belt Yes     Self-Exams Yes     Comment: Monthly     Parent/sibling w/ CABG, MI or angioplasty before 65F 55M? Not Asked   Social History Narrative     Not on file        FAMILY HISTORY:   Family History   Problem Relation Age of Onset     Cancer Other      Diabetes Other      Diabetes Other      Breast Cancer Other         ? breast - Pat GM     Psychotic Disorder Mother         bi-polar     Osteoporosis Mother      Neurologic Disorder Father         parkinsons     Heart Disease Father         heart murmur     Psychotic Disorder Sister         both sisters bi-polar        EXAM:Vitals: BP  "110/82 (BP Location: Left arm, Cuff Size: Adult Regular)   Ht 1.521 m (4' 11.9\")   Wt 61.6 kg (135 lb 12.8 oz)   BMI 26.61 kg/m     BMI= Body mass index is 26.61 kg/m .    General appearance: Patient is alert and fully cooperative with history & exam.  No sign of distress is noted during the visit.     Psychiatric: Affect is pleasant & appropriate.  Patient appears motivated to improve health.     Respiratory: Breathing is regular & unlabored while sitting.     HEENT: Hearing is intact to spoken word.  Speech is clear.  No gross evidence of visual impairment that would impact ambulation.     Vascular: DP & PT pulses are intact & regular bilaterally.  No significant edema or varicosities noted.  CFT and skin temperature is normal to both lower extremities.     Neurologic: Lower extremity sensation is intact to light touch.  No evidence of weakness or contracture in the lower extremities.  No evidence of neuropathy.    Dermatologic: Skin is intact to both lower extremities with adequate texture, turgor and tone about the integument.  No paronychia or evidence of soft tissue infection is noted.     Musculoskeletal: Patient is ambulatory without assistive device or brace.  There is subtle crepitus noted about the left ankle possibly more anterior drawer on the left than the right but it is not remarkable.  No peroneal subluxation manual muscle strength was 5/5 to all 4 quadrants.  Subtle discomfort noted with very firm palpation throughout the medial lateral anterior left ankle joint margin.    Radiographs: 3 views of the left ankle demonstrate adequate joint alignment.     ASSESSMENT:       ICD-10-CM    1. Traumatic arthritis of ankle, left M12.572 ORTHOTICS REFERRAL   2. History of fracture of left ankle Z87.81         PLAN:  Reviewed [facility] in [facility].      8/14/2019   This patient had an oblique fracture of the left fibula starting at the joint line moving proximal SER type mechanism of injury.  She is now " developing symptoms associated with degenerative arthritis and osteochondritis dissecans.  Recommended improved arch support improved shoe gear activities as tolerated.  Recommended physical therapy but she would like to attempt the orthotics and changes in shoe gear first.  We also reviewed injections and oral anti-inflammatories.  Follow-up with me in a couple of months and if this remains symptomatic we may consider injection and/or MRI of the ankle.  We discussed typical and expected outcomes as well as possible outcomes following an ankle fracture.  All questions were answered to her satisfaction.  She is very appreciative of the information and what to expect over time.      Kenton Reed DPM          Again, thank you for allowing me to participate in the care of your patient.        Sincerely,        Kenton Reed DPM

## 2019-08-14 NOTE — PROGRESS NOTES
HPI:  Rach Dick is a 64 year old female who is seen in consultation at the request of Wander Patel MD    Pt presents for eval of:   (Onset, Location, L/R, Character, Treatments, Injury if yes)    XR Left ankle today, 8/14/2019     Onset 10/20/2017 - Closed nondisplaced fracture of lateral malleolus of left fibula. Presents today with lateral and posterior Left ankle pain and slip on casual shoes.  Constant, dull ache Intermittent, swelling, sharp, stabbing, throbbing pain 3-7 w/certain ROM  No current treatment    Retired.    Weight management plan: Patient was referred to their PCP to discuss a diet and exercise plan.     Patient to follow up with Primary Care provider regarding elevated blood pressure.    ROS:  10 point ROS neg other than the symptoms noted above in the HPI.    Patient Active Problem List   Diagnosis     Essential and other specified forms of tremor     Irritable bowel syndrome     Hypoglycemia     Hip pain     Lumbar radiculopathy     Bunion     Fibrocystic breast changes     CARDIOVASCULAR SCREENING; LDL GOAL LESS THAN 160     Osteopenia     Health Care Home     Advanced directives, counseling/discussion     Pain in shoulder     RSD (reflex sympathetic dystrophy)     Trochanteric bursitis     H/O: hysterectomy     Adenomatous polyp of colon     Closed nondisplaced fracture of lateral malleolus of left fibula with routine healing, subsequent encounter     Closed bimalleolar fracture of left ankle, sequela     Traumatic arthritis of ankle, left     History of fracture of left ankle       PAST MEDICAL HISTORY:   Past Medical History:   Diagnosis Date     Adenomatous polyp of colon 2013    q 5 yr colonoscopy recommended     Calculus of kidney     recurrent     H/O: hysterectomy     PAPS NO LONGER INDICATED     Hemangioma NOS 2008    ? diagnosis liver     Lumbago      Lumbar radiculopathy 11/17/2008    L5 nerve impingement - see MRI     Mild intermittent asthma 7/12/2001     Nausea with  vomiting      Unspecified gastritis and gastroduodenitis without mention of hemorrhage         PAST SURGICAL HISTORY:   Past Surgical History:   Procedure Laterality Date     C NONSPECIFIC PROCEDURE      knee surgery     C NONSPECIFIC PROCEDURE  1993    lithotripsy     C OPEN RX DIST RAD/ULNA FX  12/06/2004    ORIF right distal radius fx     C TOTAL ABDOM HYSTERECTOMY      Hysterectomy, Total Abdominal removed one ovary     COLONOSCOPY  10/27/08     HC COLONOSCOPY W/WO BRUSH/WASH  09/12/05     HC CYSTOURETHROSCOPY  10/18/2004    Vaginal biopsy with cystoscopic guidance.     HC EXCISION BREAST LESION W XRAY MARKER, OPEN SINGLE  4/10/2007    Left      HC REMOVAL GALLBLADDER  1991    Cholecystectomy        MEDICATIONS:   Current Outpatient Medications:      CALCIUM + D OR, 2 TABLET ONCE A DAY, Disp: , Rfl:      cetirizine (ZYRTEC ALLERGY) 10 MG tablet, Take 10 mg by mouth daily, Disp: , Rfl:      Chromium 200 MCG TABS tablet, Take 200 mcg by mouth daily, Disp: , Rfl:      MULTIVITAMIN OR, TAKE ONE TABLET DAILY., Disp: , Rfl:      ALLERGIES:    Allergies   Allergen Reactions     Sulfa Drugs Hives     Few hives on arm     Droperidol Other (See Comments)     Lock jaw     Prochlorperazine Other (See Comments)     Compazine: Lock jaw        SOCIAL HISTORY:   Social History     Socioeconomic History     Marital status:      Spouse name: Chuck     Number of children: 4     Years of education: 15     Highest education level: Not on file   Occupational History     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   Social Needs     Financial resource strain: Not on file     Food insecurity:     Worry: Not on file     Inability: Not on file     Transportation needs:     Medical: Not on file     Non-medical: Not on file   Tobacco Use     Smoking status: Never Smoker     Smokeless tobacco: Never Used     Tobacco comment: no smokers in the household   Substance and Sexual Activity     Alcohol  "use: No     Drug use: No     Sexual activity: Not Currently     Partners: Male     Birth control/protection: Surgical     Comment: Hysterectomy   Lifestyle     Physical activity:     Days per week: Not on file     Minutes per session: Not on file     Stress: Not on file   Relationships     Social connections:     Talks on phone: Not on file     Gets together: Not on file     Attends Yazidi service: Not on file     Active member of club or organization: Not on file     Attends meetings of clubs or organizations: Not on file     Relationship status: Not on file     Intimate partner violence:     Fear of current or ex partner: Not on file     Emotionally abused: Not on file     Physically abused: Not on file     Forced sexual activity: Not on file   Other Topics Concern      Service No     Blood Transfusions No     Caffeine Concern No     Occupational Exposure No     Hobby Hazards No     Sleep Concern No     Stress Concern No     Weight Concern No     Special Diet No     Back Care Yes     Comment: Hx; back pain     Exercise No     Bike Helmet Not Asked     Comment: N/A     Seat Belt Yes     Self-Exams Yes     Comment: Monthly     Parent/sibling w/ CABG, MI or angioplasty before 65F 55M? Not Asked   Social History Narrative     Not on file        FAMILY HISTORY:   Family History   Problem Relation Age of Onset     Cancer Other      Diabetes Other      Diabetes Other      Breast Cancer Other         ? breast - Pat GM     Psychotic Disorder Mother         bi-polar     Osteoporosis Mother      Neurologic Disorder Father         parkinsons     Heart Disease Father         heart murmur     Psychotic Disorder Sister         both sisters bi-polar        EXAM:Vitals: /82 (BP Location: Left arm, Cuff Size: Adult Regular)   Ht 1.521 m (4' 11.9\")   Wt 61.6 kg (135 lb 12.8 oz)   BMI 26.61 kg/m    BMI= Body mass index is 26.61 kg/m .    General appearance: Patient is alert and fully cooperative with history & " exam.  No sign of distress is noted during the visit.     Psychiatric: Affect is pleasant & appropriate.  Patient appears motivated to improve health.     Respiratory: Breathing is regular & unlabored while sitting.     HEENT: Hearing is intact to spoken word.  Speech is clear.  No gross evidence of visual impairment that would impact ambulation.     Vascular: DP & PT pulses are intact & regular bilaterally.  No significant edema or varicosities noted.  CFT and skin temperature is normal to both lower extremities.     Neurologic: Lower extremity sensation is intact to light touch.  No evidence of weakness or contracture in the lower extremities.  No evidence of neuropathy.    Dermatologic: Skin is intact to both lower extremities with adequate texture, turgor and tone about the integument.  No paronychia or evidence of soft tissue infection is noted.     Musculoskeletal: Patient is ambulatory without assistive device or brace.  There is subtle crepitus noted about the left ankle possibly more anterior drawer on the left than the right but it is not remarkable.  No peroneal subluxation manual muscle strength was 5/5 to all 4 quadrants.  Subtle discomfort noted with very firm palpation throughout the medial lateral anterior left ankle joint margin.    Radiographs: 3 views of the left ankle demonstrate adequate joint alignment.     ASSESSMENT:       ICD-10-CM    1. Traumatic arthritis of ankle, left M12.572 ORTHOTICS REFERRAL   2. History of fracture of left ankle Z87.81         PLAN:  Reviewed patient's chart in [facility].      8/14/2019   This patient had an oblique fracture of the left fibula starting at the joint line moving proximal SER type mechanism of injury.  She is now developing symptoms associated with degenerative arthritis and osteochondritis dissecans.  Recommended improved arch support improved shoe gear activities as tolerated.  Recommended physical therapy but she would like to attempt the orthotics and  changes in shoe gear first.  We also reviewed injections and oral anti-inflammatories.  Follow-up with me in a couple of months and if this remains symptomatic we may consider injection and/or MRI of the ankle.  We discussed typical and expected outcomes as well as possible outcomes following an ankle fracture.  All questions were answered to her satisfaction.  She is very appreciative of the information and what to expect over time.      Kenton Reed DPM

## 2019-09-27 ENCOUNTER — HEALTH MAINTENANCE LETTER (OUTPATIENT)
Age: 64
End: 2019-09-27

## 2020-02-07 ENCOUNTER — HOSPITAL ENCOUNTER (EMERGENCY)
Facility: CLINIC | Age: 65
Discharge: HOME | End: 2020-02-07
Attending: FAMILY MEDICINE | Admitting: FAMILY MEDICINE
Payer: COMMERCIAL

## 2020-02-07 ENCOUNTER — NURSE TRIAGE (OUTPATIENT)
Dept: FAMILY MEDICINE | Facility: CLINIC | Age: 65
End: 2020-02-07

## 2020-02-07 ENCOUNTER — APPOINTMENT (OUTPATIENT)
Dept: CT IMAGING | Facility: CLINIC | Age: 65
End: 2020-02-07
Attending: FAMILY MEDICINE
Payer: COMMERCIAL

## 2020-02-07 VITALS
HEIGHT: 61 IN | BODY MASS INDEX: 25.49 KG/M2 | HEART RATE: 74 BPM | OXYGEN SATURATION: 99 % | RESPIRATION RATE: 19 BRPM | SYSTOLIC BLOOD PRESSURE: 122 MMHG | TEMPERATURE: 98.7 F | DIASTOLIC BLOOD PRESSURE: 86 MMHG | WEIGHT: 135 LBS

## 2020-02-07 DIAGNOSIS — K63.89 EPIPLOIC APPENDAGITIS: ICD-10-CM

## 2020-02-07 LAB
ALBUMIN SERPL-MCNC: 4 G/DL (ref 3.4–5)
ALBUMIN UR-MCNC: NEGATIVE MG/DL
ALP SERPL-CCNC: 85 U/L (ref 40–150)
ALT SERPL W P-5'-P-CCNC: 83 U/L (ref 0–50)
ANION GAP SERPL CALCULATED.3IONS-SCNC: 6 MMOL/L (ref 3–14)
APPEARANCE UR: ABNORMAL
AST SERPL W P-5'-P-CCNC: 35 U/L (ref 0–45)
BASOPHILS # BLD AUTO: 0 10E9/L (ref 0–0.2)
BASOPHILS NFR BLD AUTO: 0.9 %
BILIRUB SERPL-MCNC: 0.7 MG/DL (ref 0.2–1.3)
BILIRUB UR QL STRIP: NEGATIVE
BUN SERPL-MCNC: 18 MG/DL (ref 7–30)
CALCIUM SERPL-MCNC: 9.2 MG/DL (ref 8.5–10.1)
CHLORIDE SERPL-SCNC: 106 MMOL/L (ref 94–109)
CO2 SERPL-SCNC: 28 MMOL/L (ref 20–32)
COLOR UR AUTO: YELLOW
CREAT SERPL-MCNC: 0.9 MG/DL (ref 0.52–1.04)
DIFFERENTIAL METHOD BLD: ABNORMAL
EOSINOPHIL NFR BLD AUTO: 4 %
ERYTHROCYTE [DISTWIDTH] IN BLOOD BY AUTOMATED COUNT: 12.8 % (ref 10–15)
GFR SERPL CREATININE-BSD FRML MDRD: 66 ML/MIN/{1.73_M2}
GLUCOSE SERPL-MCNC: 97 MG/DL (ref 70–99)
GLUCOSE UR STRIP-MCNC: NEGATIVE MG/DL
HCT VFR BLD AUTO: 46.7 % (ref 35–47)
HGB BLD-MCNC: 16.1 G/DL (ref 11.7–15.7)
HGB UR QL STRIP: NEGATIVE
IMM GRANULOCYTES # BLD: 0 10E9/L (ref 0–0.4)
IMM GRANULOCYTES NFR BLD: 0 %
KETONES UR STRIP-MCNC: 20 MG/DL
LEUKOCYTE ESTERASE UR QL STRIP: NEGATIVE
LYMPHOCYTES # BLD AUTO: 1.1 10E9/L (ref 0.8–5.3)
LYMPHOCYTES NFR BLD AUTO: 26.6 %
MCH RBC QN AUTO: 31.4 PG (ref 26.5–33)
MCHC RBC AUTO-ENTMCNC: 34.5 G/DL (ref 31.5–36.5)
MCV RBC AUTO: 91 FL (ref 78–100)
MONOCYTES # BLD AUTO: 0.3 10E9/L (ref 0–1.3)
MONOCYTES NFR BLD AUTO: 7.5 %
MUCOUS THREADS #/AREA URNS LPF: PRESENT /LPF
NEUTROPHILS # BLD AUTO: 2.6 10E9/L (ref 1.6–8.3)
NEUTROPHILS NFR BLD AUTO: 61 %
NITRATE UR QL: NEGATIVE
NRBC # BLD AUTO: 0 10*3/UL
NRBC BLD AUTO-RTO: 0 /100
PH UR STRIP: 6 PH (ref 5–7)
PLATELET # BLD AUTO: 152 10E9/L (ref 150–450)
POTASSIUM SERPL-SCNC: 3.7 MMOL/L (ref 3.4–5.3)
PROT SERPL-MCNC: 7.3 G/DL (ref 6.8–8.8)
RBC # BLD AUTO: 5.12 10E12/L (ref 3.8–5.2)
RBC #/AREA URNS AUTO: 1 /HPF (ref 0–2)
SODIUM SERPL-SCNC: 140 MMOL/L (ref 133–144)
SOURCE: ABNORMAL
SP GR UR STRIP: 1.02 (ref 1–1.03)
SQUAMOUS #/AREA URNS AUTO: <1 /HPF (ref 0–1)
UROBILINOGEN UR STRIP-MCNC: 0 MG/DL (ref 0–2)
WBC # BLD AUTO: 4.3 10E9/L (ref 4–11)
WBC #/AREA URNS AUTO: 1 /HPF (ref 0–5)

## 2020-02-07 PROCEDURE — 96374 THER/PROPH/DIAG INJ IV PUSH: CPT | Performed by: FAMILY MEDICINE

## 2020-02-07 PROCEDURE — 81001 URINALYSIS AUTO W/SCOPE: CPT | Performed by: FAMILY MEDICINE

## 2020-02-07 PROCEDURE — 74176 CT ABD & PELVIS W/O CONTRAST: CPT

## 2020-02-07 PROCEDURE — 25000128 H RX IP 250 OP 636: Performed by: FAMILY MEDICINE

## 2020-02-07 PROCEDURE — 85025 COMPLETE CBC W/AUTO DIFF WBC: CPT | Performed by: FAMILY MEDICINE

## 2020-02-07 PROCEDURE — 99284 EMERGENCY DEPT VISIT MOD MDM: CPT | Mod: 25 | Performed by: FAMILY MEDICINE

## 2020-02-07 PROCEDURE — 80053 COMPREHEN METABOLIC PANEL: CPT | Performed by: FAMILY MEDICINE

## 2020-02-07 PROCEDURE — 99284 EMERGENCY DEPT VISIT MOD MDM: CPT | Mod: Z6 | Performed by: FAMILY MEDICINE

## 2020-02-07 PROCEDURE — 81003 URINALYSIS AUTO W/O SCOPE: CPT | Performed by: FAMILY MEDICINE

## 2020-02-07 RX ORDER — HYDROMORPHONE HYDROCHLORIDE 1 MG/ML
0.5 INJECTION, SOLUTION INTRAMUSCULAR; INTRAVENOUS; SUBCUTANEOUS
Status: DISCONTINUED | OUTPATIENT
Start: 2020-02-07 | End: 2020-02-07 | Stop reason: HOSPADM

## 2020-02-07 RX ORDER — KETOROLAC TROMETHAMINE 15 MG/ML
15 INJECTION, SOLUTION INTRAMUSCULAR; INTRAVENOUS ONCE
Status: COMPLETED | OUTPATIENT
Start: 2020-02-07 | End: 2020-02-07

## 2020-02-07 RX ADMIN — KETOROLAC TROMETHAMINE 15 MG: 15 INJECTION, SOLUTION INTRAMUSCULAR; INTRAVENOUS at 13:15

## 2020-02-07 ASSESSMENT — ENCOUNTER SYMPTOMS
FATIGUE: 0
DYSURIA: 0
FLANK PAIN: 1
FREQUENCY: 0
CHILLS: 0
PALPITATIONS: 0
FEVER: 0
VOMITING: 0
CONSTIPATION: 0
BLOOD IN STOOL: 0
DIARRHEA: 0
HEMATURIA: 0
NAUSEA: 0
DIFFICULTY URINATING: 0
ABDOMINAL PAIN: 1
ABDOMINAL DISTENTION: 0

## 2020-02-07 ASSESSMENT — MIFFLIN-ST. JEOR: SCORE: 1089.74

## 2020-02-07 NOTE — ED AVS SNAPSHOT
[facility] Emergency Department  [address] [place] [address]  Phone:  [phone]  Fax:  [phone]                                    Rach Dick   MRN: [id number]    Department:  [facility] Emergency Department   Date of Visit:  2/7/2020           After Visit Summary Signature Page    I have received my discharge instructions, and my questions have been answered. I have discussed any challenges I see with this plan with the nurse or doctor.    ..........................................................................................................................................  Patient/Patient Representative Signature      ..........................................................................................................................................  Patient Representative Print Name and Relationship to Patient    ..................................................               ................................................  Date                                   Time    ..........................................................................................................................................  Reviewed by Signature/Title    ...................................................              ..............................................  Date                                               Time          22EPIC Rev 08/18

## 2020-02-07 NOTE — DISCHARGE INSTRUCTIONS
Epiploic Appendagitis    What is epiploic appendagitis?  Epiploic appendagitis is a rare condition that causes intense stomach pain. It s often mistaken for other conditions, such as diverticulitis or appendicitis.   It happens when you lose blood flow to very small pouches of fat that are situated over the colon, or large intestine. This fatty tissue gets its blood supply from small vessels attached to the outside of the colon. Because these pouches of tissue are thin and narrow, their blood supply can become easily cut off. When this happens, the tissue becomes inflamed. These pouches are called epiploic appendages. People typically have between 50 and 100 of them over their large intestine.   Unlike the conditions it s often confused with, epiploic appendagitis usually doesn t require surgical treatment.   What are the symptoms of epiploic appendagitis?  The main symptom of epiploic appendagitis is abdominal pain. The epiploic appendages on the left side of your colon tend to be larger and more vulnerable to becoming twisted or irritated. As a result, you re more likely to feel pain in your lower left abdomen.   You may also notice the pain come and go. If you press on the area that hurts, you might feel some tenderness when you remove your hand. The pain often gets worse when you stretch, cough, or take a deep breath.   Unlike other abdominal conditions, the pain typically stays in the same place once it starts. Blood tests tend to be normal. It s also rare to have:   nausea   fever   vomiting   loss of appetite   diarrhea  What causes epiploic appendagitis?  There are two categories of epiploic appendagitis: primary epiploic appendagitis and secondary epiploic appendagitis. While they both involve a loss of blood flow to your epiploic appendages, they have different causes.   Primary epiploic appendagitis  Primary epiploic appendagitis occurs when the blood supply to your epiploic appendages gets cut off.  Sometimes an appendage gets twisted, which pinches blood vessels and stops the flow of blood. In other cases, the blood vessels can suddenly collapse or get a blood clot. This blocks the flow of blood to the appendage.  Secondary epiploic appendagitis  Secondary epiploic appendagitis occurs when the tissue around the colon, or the colon itself, becomes infected or inflamed, like in diverticulitis or appendicitis. Any inflammation and swelling that changes the blood flow in and around the colon can change the blood flowTrusted Source to the appendages.      Who gets epiploic appendagitis?  Few things increase your risk of developing epiploic appendagitis. However, it seems to be more common in men between the ages of 40 and 50  Other possible risk factors include:   Obesity. Obesity can increase the number of appendages.   Large meals. Eating larger meals can alter the blood flow to the intestinal tract.  How is it diagnosed?  Diagnosing epiploic appendagitis usually involves ruling out other conditions with similar symptoms, such as diverticulitis or appendicitis. Your doctor will start by giving you a physical exam and asking about your symptoms and medical history.   They may also perform a blood test to look at your white blood cell count. If it s abnormally elevated, you re more likely to have diverticulitis or another condition. You may also have a fever if you have diverticulitis, which happens when pouches from your colon become inflamed or infected.   You may also need a CT scan. This imaging test gives your doctor a better view of your abdomen. It allows them to see what might be causing your symptoms. Epiploic appendagitis looks different on a CT scan compared to other intestinal problems.   What are the treatments for epiploic appendagitis?  Epiploic appendagitis is usually considered to be a self-limiting disease. This means it goes away on its own without treatment. In the meantime, your doctor may  suggest taking over-the-counter pain relievers, such as acetaminophen (Tylenol) or ibuprofen (Advil). You may need antibiotics in some cases. Your symptoms should start to get better within a week.  Surgery may be necessary in cases of significant complications or recurrent episodes.  There s no specific diet that someone with epiploic appendagitis should or shouldn t follow. However, because obesity and eating large meals seem to be risk factors, eating a balanced diet with portion control to maintain a healthy weight may help prevent episodes.   Cases of secondary epiploic appendagitis usually clear up once the underlying condition is treated. Depending on the condition, you may need to have your appendix or gallbladder removed, or other intestinal surgery.   What s the outlook?  While the pain of epiploic appendagitis can be intense, the condition usually resolves on its own within about a week.   Keep in mind that this condition is relatively rare. If you have severe abdominal pain, it s best to see your doctor so they can rule other possible and more common causes that may need surgical treatment, such as appendicitis.

## 2020-02-07 NOTE — ED TRIAGE NOTES
Left sided lower abdominal/groin/flank pain that has been getting worse in the last 3 days.  Denies fevers or urinary problems.  Does have hx of kidney stones.

## 2020-02-07 NOTE — ED PROVIDER NOTES
History     Chief Complaint   Patient presents with     Abdominal Pain     Flank Pain     HPI  Rach Dick is a 65 year old female who presents with left flank and left lower abdominal pain. She reports that the pain started last Tuesday with cramping that felt like gas pains. She states that she had a normal bowel movement without any relief in her symptoms. She has continued to have persistent, colicky left flank pain which has not improved and is 6/10 at this time. She denies any nausea or vomiting. She also denies any urinary symptoms including, hematuria, dysuria, or decreased urinary output.     Allergies:  Allergies   Allergen Reactions     Sulfa Drugs Hives     Few hives on arm     Droperidol Other (See Comments)     Lock jaw     Prochlorperazine Other (See Comments)     Compazine: Lock jaw       Problem List:    Patient Active Problem List    Diagnosis Date Noted     Health Care Home 06/01/2011     Priority: High     EMERGENCY CARE PLAN  Presenting Problem Signs and Symptoms Treatment Plan    Questions or conerns during clinic hours    I will call the clinic directly     Questions or conerns outside clinic hours    I will call the 24 hour nurse line at [phone]    Patient needs to schedule an appointment    I will call the 24 hour scheduling team at [phone] or clinic directly    Same day treatment     I will call the clinic first, nurse line if after hours, urgent care and express care if needed     DX V65.8 REPLACED WITH [id number] HEALTH CARE HOME (04/08/2013)       Traumatic arthritis of ankle, left 08/14/2019     Priority: Medium     History of fracture of left ankle 08/14/2019     Priority: Medium     Closed bimalleolar fracture of left ankle, sequela 08/08/2019     Priority: Medium     Closed nondisplaced fracture of lateral malleolus of left fibula with routine healing, subsequent encounter 12/13/2017     Priority: Medium     Adenomatous polyp of colon      Priority: Medium     q 5 yr  colonoscopy recommended       H/O: hysterectomy      Priority: Medium     PAPS NO LONGER INDICATED- one ovary remains       Trochanteric bursitis 03/22/2013     Priority: Medium     RSD (reflex sympathetic dystrophy) 09/11/2012     Priority: Medium     Pain in shoulder 11/01/2011     Priority: Medium     Left side.       Advanced directives, counseling/discussion 10/31/2011     Priority: Medium     Advance Directive Problem List Overview:   Name Relationship Phone    Primary Health Care Agent            Alternative Health Care Agent          Discussed advance care planning with patient; information given to patient to review.sah   10/31/2011        Glenn Wesley MD  02/08/20 1723         Osteopenia 04/27/2011     Priority: Medium     See DEXA       CARDIOVASCULAR SCREENING; LDL GOAL LESS THAN 160 10/31/2010     Priority: Medium     Fibrocystic breast changes 06/08/2010     Priority: Medium     Bunion 11/02/2009     Priority: Medium     Lumbar radiculopathy 11/17/2008     Priority: Medium     L5 nerve impingement - see MRI       Hip pain 11/04/2008     Priority: Medium     Hypoglycemia 09/19/2003     Priority: Medium     Problem list name updated by automated process. Provider to review       Essential and other specified forms of tremor      Priority: Medium     Essential tremor per neuro       Irritable bowel syndrome      Priority: Medium        Past Medical History:    Past Medical History:   Diagnosis Date     Adenomatous polyp of colon 2013     Calculus of kidney      H/O: hysterectomy      Hemangioma NOS 2008     Lumbago      Lumbar radiculopathy 11/17/2008     Mild intermittent asthma 7/12/2001     Nausea with vomiting      Unspecified gastritis and gastroduodenitis without mention of hemorrhage        Past Surgical History:    Past Surgical History:   Procedure Laterality Date     C NONSPECIFIC PROCEDURE      knee surgery     C NONSPECIFIC PROCEDURE  1993    lithotripsy     C OPEN RX DIST RAD/ULNA  "FX  12/06/2004    ORIF right distal radius fx     C TOTAL ABDOM HYSTERECTOMY      Hysterectomy, Total Abdominal removed one ovary     COLONOSCOPY  10/27/08     HC COLONOSCOPY W/WO BRUSH/WASH  09/12/05     HC CYSTOURETHROSCOPY  10/18/2004    Vaginal biopsy with cystoscopic guidance.     HC EXCISION BREAST LESION W XRAY MARKER, OPEN SINGLE  4/10/2007    Left      HC REMOVAL GALLBLADDER  1991    Cholecystectomy       Family History:    Family History   Problem Relation Age of Onset     Cancer Other      Diabetes Other      Diabetes Other      Breast Cancer Other         ? breast - Pat GM     Psychotic Disorder Mother         bi-polar     Osteoporosis Mother      Neurologic Disorder Father         parkinsons     Heart Disease Father         heart murmur     Psychotic Disorder Sister         both sisters bi-polar       Social History:  Marital Status:   [2]  Social History     Tobacco Use     Smoking status: Never Smoker     Smokeless tobacco: Never Used     Tobacco comment: no smokers in the household   Substance Use Topics     Alcohol use: No     Drug use: No        Medications:    CALCIUM + D OR  cetirizine (ZYRTEC ALLERGY) 10 MG tablet  Chromium 200 MCG TABS tablet  MULTIVITAMIN OR          Review of Systems   Constitutional: Negative for chills, fatigue and fever.   Cardiovascular: Negative for chest pain and palpitations.   Gastrointestinal: Positive for abdominal pain. Negative for abdominal distention, blood in stool, constipation, diarrhea, nausea and vomiting.   Genitourinary: Positive for flank pain. Negative for difficulty urinating, dysuria, frequency, hematuria, vaginal bleeding and vaginal discharge.   All other systems reviewed and are negative.      Physical Exam   BP: 121/89  Pulse: 100  Temp: 98.7  F (37.1  C)  Resp: 19  Height: 154.9 cm (5' 1\")  Weight: 61.2 kg (135 lb)  SpO2: 99 %      Physical Exam  Constitutional:       General: She is awake.      Appearance: Normal appearance. She is " well-developed and normal weight. She is ill-appearing. She is not toxic-appearing.   Cardiovascular:      Rate and Rhythm: Normal rate and regular rhythm.      Heart sounds: Normal heart sounds.   Pulmonary:      Effort: Pulmonary effort is normal.      Breath sounds: Normal breath sounds.   Abdominal:      General: Abdomen is flat. Bowel sounds are normal. There is no distension.      Palpations: Abdomen is soft. There is no mass.      Tenderness: There is abdominal tenderness in the left lower quadrant. There is left CVA tenderness.      Hernia: No hernia is present.   Skin:     General: Skin is warm and moist.   Neurological:      Mental Status: She is alert.   Psychiatric:         Behavior: Behavior is cooperative.         ED Course  Patient was interviewed to gather HPI and ROS details. Physical exam was preformed with evaluation of the her left flank and left lower quadrant pain. A CBC, CMP, and UA were obtained initially and found to be benign. We proceeded with a CT without contrast to evaluate the etiology of her pain. She denied the need for pain medication throughout her stay here in the ED. She remained vitally stable and did not have any nausea or vomiting.        Results for JANIA LIM (MRN [id number]) as of 2/7/2020 14:57   Ref. Range 2/7/2020 13:09 2/7/2020 14:00   Sodium Latest Ref Range: 133 - 144 mmol/L 140    Potassium Latest Ref Range: 3.4 - 5.3 mmol/L 3.7    Chloride Latest Ref Range: 94 - 109 mmol/L 106    Carbon Dioxide Latest Ref Range: 20 - 32 mmol/L 28    Urea Nitrogen Latest Ref Range: 7 - 30 mg/dL 18    Creatinine Latest Ref Range: 0.52 - 1.04 mg/dL 0.90    GFR Estimate Latest Ref Range: >60 mL/min/1.73_m2 66    GFR Estimate If Black Latest Ref Range: >60 mL/min/1.73_m2 77    Calcium Latest Ref Range: 8.5 - 10.1 mg/dL 9.2    Anion Gap Latest Ref Range: 3 - 14 mmol/L 6    Albumin Latest Ref Range: 3.4 - 5.0 g/dL 4.0    Protein Total Latest Ref Range: 6.8 - 8.8 g/dL 7.3    Bilirubin  Total Latest Ref Range: 0.2 - 1.3 mg/dL 0.7    Alkaline Phosphatase Latest Ref Range: 40 - 150 U/L 85    ALT Latest Ref Range: 0 - 50 U/L 83 (H)    AST Latest Ref Range: 0 - 45 U/L 35    Glucose Latest Ref Range: 70 - 99 mg/dL 97    WBC Latest Ref Range: 4.0 - 11.0 10e9/L 4.3    Hemoglobin Latest Ref Range: 11.7 - 15.7 g/dL 16.1 (H)    Hematocrit Latest Ref Range: 35.0 - 47.0 % 46.7    Platelet Count Latest Ref Range: 150 - 450 10e9/L 152    RBC Count Latest Ref Range: 3.8 - 5.2 10e12/L 5.12    MCV Latest Ref Range: 78 - 100 fl 91    MCH Latest Ref Range: 26.5 - 33.0 pg 31.4    MCHC Latest Ref Range: 31.5 - 36.5 g/dL 34.5    RDW Latest Ref Range: 10.0 - 15.0 % 12.8    Diff Method Unknown Automated Method    % Neutrophils Latest Units: % 61.0    % Lymphocytes Latest Units: % 26.6    % Monocytes Latest Units: % 7.5    % Eosinophils Latest Units: % 4.0    % Basophils Latest Units: % 0.9    % Immature Granulocytes Latest Units: % 0.0    Nucleated RBCs Latest Ref Range: 0 /100 0    Absolute Neutrophil Latest Ref Range: 1.6 - 8.3 10e9/L 2.6    Absolute Lymphocytes Latest Ref Range: 0.8 - 5.3 10e9/L 1.1    Absolute Monocytes Latest Ref Range: 0.0 - 1.3 10e9/L 0.3    Absolute Basophils Latest Ref Range: 0.0 - 0.2 10e9/L 0.0    Abs Immature Granulocytes Latest Ref Range: 0 - 0.4 10e9/L 0.0    Absolute Nucleated RBC Unknown 0.0    Color Urine Unknown  Yellow   Appearance Urine Unknown  Slightly Cloudy   Glucose Urine Latest Ref Range: NEG^Negative mg/dL  Negative   Bilirubin Urine Latest Ref Range: NEG^Negative   Negative   Ketones Urine Latest Ref Range: NEG^Negative mg/dL  20 (A)   Specific Gravity Urine Latest Ref Range: 1.003 - 1.035   1.016   pH Urine Latest Ref Range: 5.0 - 7.0 pH  6.0   Protein Albumin Urine Latest Ref Range: NEG^Negative mg/dL  Negative   Urobilinogen mg/dL Latest Ref Range: 0.0 - 2.0 mg/dL  0.0   Nitrite Urine Latest Ref Range: NEG^Negative   Negative   Blood Urine Latest Ref Range: NEG^Negative    Negative   Leukocyte Esterase Urine Latest Ref Range: NEG^Negative   Negative   Source Unknown  Midstream Urine   WBC Urine Latest Ref Range: 0 - 5 /HPF  1   RBC Urine Latest Ref Range: 0 - 2 /HPF  1   Squamous Epithelial /HPF Urine Latest Ref Range: 0 - 1 /HPF  <1   Mucous Urine Latest Ref Range: NEG^Negative /LPF  Present (A)     CT ABDOMEN AND PELVIS WITHOUT CONTRAST   2/7/2020 2:08 PM      HISTORY:  Left-sided abdomen pain. History of kidney stone.     TECHNIQUE: Noncontrast CT abdomen and pelvis was performed. Radiation  dose for this scan was reduced using automated exposure control,  adjustment of the mA and/or kV according to patient size, or iterative  reconstruction technique.     COMPARISON: CT abdomen and pelvis on 4/15/2007.     FINDINGS:   Lower chest: Minimal bibasilar atelectasis.     Abdomen/pelvis:  Right kidney: No radiodense kidney stones or hydronephrosis.     Left kidney: No radiodense kidney stones or hydronephrosis.     Urinary bladder: Partially distended and unremarkable.     Remainder of the abdomen and pelvis: Limited evaluation of the  abdominal organs due to lack of IV contrast. Right upper quadrant  postcholecystectomy clips. Diffuse hypoattenuation of the liver,  likely due to underlying hepatic steatosis. Hepatosplenic calcified  granulomas. No splenomegaly. No adrenal nodules. No main pancreatic  ductal dilatation.     No abnormally dilated bowel loops. No significant free fluid in the  abdomen or pelvis. No free peritoneal or portal venous gas. The  appendix is visualized and appears normal. Multiple pelvic  phleboliths. There is 1.5 cm fat attenuation ovoid area with  peripheral dense rim in the deep pelvis abutting the sigmoid colon  (series 2 image 327), likely represents acute epiploic appendagitis.     Bones and soft tissues: No suspicious osseous lesion. Small  fat-containing umbilical hernia.                                                                       IMPRESSION:  1. No radiodense kidney stones or hydronephrosis in either kidney.  2. 1.5 cm fat attenuation ovoid area with peripheral dense rim in the  deep pelvis abutting the sigmoid colon loop, likely represents acute  epiploic appendagitis.  3. Significant hepatic steatosis.  4. Hepatosplenic granulomas.  Procedures     No results found for this or any previous visit (from the past 24 hour(s)).    Medications - No data to display    Assessments & Plan (with Medical Decision Making)  1. Epiploic Appendagitis   -This was found on the CT scan and seems to be contributing to her symptoms. She denied the need for additional pain medications beyond ibuprofen and tylenol. She agreed to symptomatic management of this at home.   2. Renal calculi   -No evidence of renal calculi based off of CT results.   3. Urinary tract infection   -No signs of infection based off of UA results   4. Hepatic Steatosis and Hepatosplenic granulomas  -Monitor at this time. Results of CT scan will be made available to PCP.      I have reviewed the nursing notes.    I have reviewed the findings, diagnosis, plan and need for follow up with the patient.              2/7/2020   [facility] EMERGENCY DEPARTMENT

## 2021-01-09 ENCOUNTER — HEALTH MAINTENANCE LETTER (OUTPATIENT)
Age: 66
End: 2021-01-09

## 2021-06-17 ENCOUNTER — OFFICE VISIT (OUTPATIENT)
Dept: FAMILY MEDICINE | Facility: CLINIC | Age: 66
End: 2021-06-17
Payer: COMMERCIAL

## 2021-06-17 ENCOUNTER — HOSPITAL ENCOUNTER (OUTPATIENT)
Dept: MAMMOGRAPHY | Facility: CLINIC | Age: 66
Discharge: HOME | End: 2021-06-17
Attending: PHYSICIAN ASSISTANT | Admitting: PHYSICIAN ASSISTANT
Payer: COMMERCIAL

## 2021-06-17 VITALS
OXYGEN SATURATION: 97 % | SYSTOLIC BLOOD PRESSURE: 112 MMHG | DIASTOLIC BLOOD PRESSURE: 76 MMHG | WEIGHT: 128 LBS | TEMPERATURE: 97.8 F | BODY MASS INDEX: 24.17 KG/M2 | HEIGHT: 61 IN | HEART RATE: 78 BPM

## 2021-06-17 DIAGNOSIS — Z23 NEED FOR PNEUMOCOCCAL VACCINATION: ICD-10-CM

## 2021-06-17 DIAGNOSIS — Z12.31 VISIT FOR SCREENING MAMMOGRAM: ICD-10-CM

## 2021-06-17 DIAGNOSIS — Z12.11 COLON CANCER SCREENING: ICD-10-CM

## 2021-06-17 DIAGNOSIS — Z00.00 ROUTINE GENERAL MEDICAL EXAMINATION AT A HEALTH CARE FACILITY: ICD-10-CM

## 2021-06-17 DIAGNOSIS — Z00.00 MEDICARE ANNUAL WELLNESS VISIT, SUBSEQUENT: Primary | ICD-10-CM

## 2021-06-17 PROCEDURE — 90732 PPSV23 VACC 2 YRS+ SUBQ/IM: CPT | Performed by: PHYSICIAN ASSISTANT

## 2021-06-17 PROCEDURE — 77063 BREAST TOMOSYNTHESIS BI: CPT

## 2021-06-17 PROCEDURE — G0009 ADMIN PNEUMOCOCCAL VACCINE: HCPCS | Performed by: PHYSICIAN ASSISTANT

## 2021-06-17 PROCEDURE — 99397 PER PM REEVAL EST PAT 65+ YR: CPT | Mod: 25 | Performed by: PHYSICIAN ASSISTANT

## 2021-06-17 ASSESSMENT — ENCOUNTER SYMPTOMS
BREAST MASS: 0
MYALGIAS: 0
HEARTBURN: 0
ARTHRALGIAS: 1
HEMATURIA: 0
FREQUENCY: 0
HEADACHES: 0
JOINT SWELLING: 0
DYSURIA: 0
WEAKNESS: 0
NERVOUS/ANXIOUS: 0
HEMATOCHEZIA: 0
EYE PAIN: 0
PALPITATIONS: 0
CONSTIPATION: 0
NAUSEA: 0
DIARRHEA: 0
PARESTHESIAS: 0
ABDOMINAL PAIN: 0
DIZZINESS: 0
SORE THROAT: 0
SHORTNESS OF BREATH: 0
FEVER: 0
COUGH: 0
CHILLS: 0

## 2021-06-17 ASSESSMENT — ACTIVITIES OF DAILY LIVING (ADL): CURRENT_FUNCTION: NO ASSISTANCE NEEDED

## 2021-06-17 ASSESSMENT — MIFFLIN-ST. JEOR: SCORE: 1052.98

## 2021-06-17 NOTE — PATIENT INSTRUCTIONS
Preventive Health Recommendations    See your health care provider every year to    Review health changes.     Discuss preventive care.      Review your medicines if your doctor has prescribed any.      You no longer need a yearly Pap test unless you've had an abnormal Pap test in the past 10 years. If you have vaginal symptoms, such as bleeding or discharge, be sure to talk with your provider about a Pap test.      Every 1 to 2 years, have a mammogram.  If you are over 69, talk with your health care provider about whether or not you want to continue having screening mammograms.      Every 10 years, have a colonoscopy. Or, have a yearly FIT test (stool test). These exams will check for colon cancer.       Have a cholesterol test every 5 years, or more often if your doctor advises it.       Have a diabetes test (fasting glucose) every three years. If you are at risk for diabetes, you should have this test more often.       At age 65, have a bone density scan (DEXA) to check for osteoporosis (brittle bone disease).    Shots:    Get a flu shot each year.    Get a tetanus shot every 10 years.    Talk to your doctor about your pneumonia vaccines. There are now two you should receive - Pneumovax (PPSV 23) and Prevnar (PCV 13).    Talk to your pharmacist about the shingles vaccine.    Talk to your doctor about the hepatitis B vaccine.    Nutrition:     Eat at least 5 servings of fruits and vegetables each day.      Eat whole-grain bread, whole-wheat pasta and brown rice instead of white grains and rice.      Get adequate about Calcium and Vitamin D.     Lifestyle    Exercise at least 150 minutes a week (30 minutes a day, 5 days a week). This will help you control your weight and prevent disease.      Limit alcohol to one drink per day.      No smoking.       Wear sunscreen to prevent skin cancer.       See your dentist twice a year for an exam and cleaning.      See your eye doctor every 1 to 2 years to screen for  conditions such as glaucoma, macular degeneration, cataracts, etc.    Personalized Prevention Plan  You are due for the preventive services outlined below.  Your care team is available to assist you in scheduling these services.  If you have already completed any of these items, please share that information with your care team to update in your medical record.    Health Maintenance Due   Topic Date Due     ANNUAL REVIEW OF HM ORDERS  Never done     Hepatitis C Screening  Never done     Zoster (Shingles) Vaccine (1 of 2) Never done     Discuss Advance Care Planning  10/31/2016     Colorectal Cancer Screening  11/25/2018     Pneumococcal Vaccine (1 of 1 - PPSV23) 01/28/2019     Annual Wellness Visit  08/08/2020     FALL RISK ASSESSMENT  08/08/2020

## 2021-06-17 NOTE — PROGRESS NOTES
"SUBJECTIVE:   Rach Dick is a 66 year old female who presents for Preventive Visit.      Patient has been advised of split billing requirements and indicates understanding: Yes   Are you in the first 12 months of your Medicare coverage?  No    Healthy Habits:     In general, how would you rate your overall health?  Good    Frequency of exercise:  4-5 days/week    Duration of exercise:  45-60 minutes    Do you usually eat at least 4 servings of fruit and vegetables a day, include whole grains    & fiber and avoid regularly eating high fat or \"junk\" foods?  Yes    Taking medications regularly:  Yes    Medication side effects:  None    Ability to successfully perform activities of daily living:  No assistance needed    Home Safety:  No safety concerns identified    Hearing Impairment:  Difficulty following a conversation in a noisy restaurant or crowded room    In the past 6 months, have you been bothered by leaking of urine? Yes    In general, how would you rate your overall mental or emotional health?  Good      PHQ-2 Total Score: 0    Additional concerns today:  No    Do you feel safe in your environment? Yes    Have you ever done Advance Care Planning? (For example, a Health Directive, POLST, or a discussion with a medical provider or your loved ones about your wishes): No, advance care planning information given to patient to review.  Patient plans to discuss their wishes with loved ones or provider.         Fall risk  Fallen 2 or more times in the past year?: No  Any fall with injury in the past year?: No    Cognitive Screening   1) Repeat 3 items (Leader, Season, Table)    2) Clock draw: NORMAL  3) 3 item recall: Recalls 3 objects  Results: 3 items recalled: COGNITIVE IMPAIRMENT LESS LIKELY    Mini-CogTM Copyright HATTIE Jasso. Licensed by the author for use in [facility]; reprinted with permission (elmer@.[email or web address]). All rights reserved.      Do you have sleep apnea, excessive snoring or daytime " drowsiness?: no    Reviewed and updated as needed this visit by clinical staff  Tobacco  Allergies  Meds  Problems  Med Hx  Surg Hx  Fam Hx          Reviewed and updated as needed this visit by Provider  Tobacco  Allergies  Meds  Problems  Med Hx  Surg Hx  Fam Hx         Social History     Tobacco Use     Smoking status: Never Smoker     Smokeless tobacco: Never Used     Tobacco comment: no smokers in the household   Substance Use Topics     Alcohol use: No         Alcohol Use 6/17/2021   Prescreen: >3 drinks/day or >7 drinks/week? No   Prescreen: >3 drinks/day or >7 drinks/week? -       Current providers sharing in care for this patient include:   Patient Care Team:  No Ref-Primary, Physician as PCP - Wander Newell MD as Assigned PCP    The following health maintenance items are reviewed in Epic and correct as of today:  Health Maintenance Due   Topic Date Due     ZOSTER IMMUNIZATION (1 of 2) Never done     COLORECTAL CANCER SCREENING  11/25/2018     BP Readings from Last 3 Encounters:   06/17/21 112/76   02/07/20 122/86   08/14/19 110/82    Wt Readings from Last 3 Encounters:   06/17/21 58.1 kg (128 lb)   02/07/20 61.2 kg (135 lb)   08/14/19 61.6 kg (135 lb 12.8 oz)                  Patient Active Problem List   Diagnosis     Essential and other specified forms of tremor     Irritable bowel syndrome     Hypoglycemia     Hip pain     Lumbar radiculopathy     Bunion     Fibrocystic breast changes     CARDIOVASCULAR SCREENING; LDL GOAL LESS THAN 160     Osteopenia     Health Care Home     Advanced directives, counseling/discussion     Pain in shoulder     RSD (reflex sympathetic dystrophy)     Trochanteric bursitis     H/O: hysterectomy     Adenomatous polyp of colon     Closed nondisplaced fracture of lateral malleolus of left fibula with routine healing, subsequent encounter     Closed bimalleolar fracture of left ankle, sequela     Traumatic arthritis of ankle, left     History of  fracture of left ankle     Past Surgical History:   Procedure Laterality Date     C OPEN RX DIST RAD/ULNA FX  12/06/2004    ORIF right distal radius fx     C TOTAL ABDOM HYSTERECTOMY      Hysterectomy, Total Abdominal removed one ovary     COLONOSCOPY  10/27/2008     HC COLONOSCOPY W/WO BRUSH/WASH  09/12/2005     HC CYSTOURETHROSCOPY  10/18/2004    Vaginal biopsy with cystoscopic guidance.     HC EXCISION BREAST LESION W XRAY MARKER, OPEN SINGLE  04/10/2007    Left      HC REMOVAL GALLBLADDER  01/01/1991    Cholecystectomy     [facility] NONSPECIFIC PROCEDURE      knee surgery - meniscus repair     [facility] NONSPECIFIC PROCEDURE  01/01/1993    lithotripsy       Social History     Tobacco Use     Smoking status: Never Smoker     Smokeless tobacco: Never Used     Tobacco comment: no smokers in the household   Substance Use Topics     Alcohol use: No     Family History   Problem Relation Age of Onset     Cancer Other         liver and melanoma     Diabetes Other      Diabetes Other      Breast Cancer Other         ? breast - Pat GM     Psychotic Disorder Mother         bi-polar     Osteoporosis Mother      Neurologic Disorder Father         parkinsons     Heart Disease Father         heart murmur     Psychotic Disorder Sister         both sisters bi-polar         Current Outpatient Medications   Medication Sig Dispense Refill     CALCIUM + D OR 2 TABLET ONCE A DAY       cetirizine (ZYRTEC ALLERGY) 10 MG tablet Take 10 mg by mouth daily       Chromium 200 MCG TABS tablet Take 200 mcg by mouth daily       MULTIVITAMIN OR TAKE ONE TABLET DAILY.       Allergies   Allergen Reactions     Sulfa Drugs Hives     Few hives on arm     Droperidol Other (See Comments)     Lock jaw     Prochlorperazine Other (See Comments)     Compazine: Lock jaw     Reviewed immunizations today    Breast CA Risk Assessment (FHS-7) 6/17/2021   Do you have a family history of breast, colon, or ovarian cancer? No / Unknown       Mammogram Screening: Recommended  "mammography every 1-2 years with patient discussion and risk factor consideration  Pertinent mammograms are reviewed under the imaging tab.    Review of Systems   Constitutional: Negative for chills and fever.   HENT: Negative for congestion, ear pain, hearing loss and sore throat.    Eyes: Negative for pain and visual disturbance.   Respiratory: Negative for cough and shortness of breath.    Cardiovascular: Negative for chest pain, palpitations and peripheral edema.   Gastrointestinal: Negative for abdominal pain, constipation, diarrhea, heartburn, hematochezia and nausea.   Breasts:  Positive for tenderness. Negative for breast mass and discharge.   Genitourinary: Negative for dysuria, frequency, genital sores, hematuria, pelvic pain, urgency, vaginal bleeding and vaginal discharge.   Musculoskeletal: Positive for arthralgias. Negative for joint swelling and myalgias.   Skin: Negative for rash.   Neurological: Negative for dizziness, weakness, headaches and paresthesias.   Psychiatric/Behavioral: Negative for mood changes. The patient is not nervous/anxious.        OBJECTIVE:   /76   Pulse 78   Temp 97.8  F (36.6  C) (Temporal)   Ht 1.549 m (5' 1\")   Wt 58.1 kg (128 lb)   SpO2 97%   BMI 24.19 kg/m   Estimated body mass index is 24.19 kg/m  as calculated from the following:    Height as of this encounter: 1.549 m (5' 1\").    Weight as of this encounter: 58.1 kg (128 lb).  Physical Exam  GENERAL: healthy, alert and no distress  EYES: Eyes grossly normal to inspection, PERRL and conjunctivae and sclerae normal  HENT: ear canals and TM's normal, nose and mouth without ulcers or lesions  NECK: no adenopathy, no asymmetry, masses, or scars and thyroid normal to palpation  RESP: lungs clear to auscultation - no rales, rhonchi or wheezes  BREAST: normal without masses, tenderness or nipple discharge and no palpable axillary masses or adenopathy  CV: regular rate and rhythm, normal S1 S2, no S3 or S4, no " "murmur, click or rub, no peripheral edema and peripheral pulses strong  ABDOMEN: soft, nontender, no hepatosplenomegaly, no masses and bowel sounds normal  MS: no gross musculoskeletal defects noted, no edema  SKIN: no suspicious lesions or rashes  NEURO: Normal strength and tone, mentation intact and speech normal  PSYCH: mentation appears normal, affect normal/bright    Diagnostic Test Results:  Labs reviewed in Epic    ASSESSMENT / PLAN:   1. Medicare annual wellness visit, subsequent    2. Routine general medical examination at a health care facility    3. Colon cancer screening  - GASTROENTEROLOGY ADULT REF PROCEDURE ONLY; Future    4. Need for pneumococcal vaccination  - PPSV23, IM/SUBQ (2+ YRS) - [id number]    Patient has been advised of split billing requirements and indicates understanding: Yes     COUNSELING:  Reviewed preventive health counseling, as reflected in patient instructions    Estimated body mass index is 24.19 kg/m  as calculated from the following:    Height as of this encounter: 1.549 m (5' 1\").    Weight as of this encounter: 58.1 kg (128 lb).        She reports that she has never smoked. She has never used smokeless tobacco.      Appropriate preventive services were discussed with this patient, including applicable screening as appropriate for cardiovascular disease, diabetes, osteopenia/osteoporosis, and glaucoma.  As appropriate for age/gender, discussed screening for colorectal cancer, prostate cancer, breast cancer, and cervical cancer. Checklist reviewing preventive services available has been given to the patient.    Reviewed patients plan of care and provided an AVS. The Basic Care Plan (routine screening as documented in Health Maintenance) for Rach meets the Care Plan requirement. This Care Plan has been established and reviewed with the Patient.    Counseling Resources:  ATP IV Guidelines  Pooled Cohorts Equation Calculator  Breast Cancer Risk Calculator  Breast Cancer: " Medication to Reduce Risk  FRAX Risk Assessment  ICSI Preventive Guidelines  Dietary Guidelines for Americans, 2010  USDA's MyPlate  ASA Prophylaxis  Lung CA Screening    Sandra Farias PA-C  [facility]    Identified Health Risks:

## 2021-06-18 ENCOUNTER — TELEPHONE (OUTPATIENT)
Dept: FAMILY MEDICINE | Facility: CLINIC | Age: 66
End: 2021-06-18

## 2021-06-18 NOTE — TELEPHONE ENCOUNTER
Called to schedule scope, patient states she does not want to schedule at this time. She will call later this summer.

## 2021-10-23 ENCOUNTER — HEALTH MAINTENANCE LETTER (OUTPATIENT)
Age: 66
End: 2021-10-23

## 2021-11-16 ENCOUNTER — E-VISIT (OUTPATIENT)
Dept: URGENT CARE | Facility: URGENT CARE | Age: 66
End: 2021-11-16
Payer: COMMERCIAL

## 2021-11-16 ENCOUNTER — NURSE TRIAGE (OUTPATIENT)
Dept: NURSING | Facility: CLINIC | Age: 66
End: 2021-11-16
Payer: COMMERCIAL

## 2021-11-16 DIAGNOSIS — Z20.822 CLOSE EXPOSURE TO 2019 NOVEL CORONAVIRUS: Primary | ICD-10-CM

## 2021-11-16 PROCEDURE — 99421 OL DIG E/M SVC 5-10 MIN: CPT | Performed by: PHYSICIAN ASSISTANT

## 2021-11-16 NOTE — TELEPHONE ENCOUNTER
Patient calling with concerns of recent exposure to a friend who tested positive for covid on 11/15/21.  Patient reports that she is fully vaccinated and also had booster on 11/1/21.    Patient is not sure if she should/needs be tested but states she wants to be sure because she feels she was also exposed a couple weeks ago when she went to a convention in [place] 11/5/21 - 11/7/21  With a person who tested positive sometime the following week (she is not sure of that date).  Now, she says that she had dinner with a friend who tested positive yesterday.      Reason for Disposition    COVID-19 Testing, questions about    Additional Information    Negative: [1] CLOSE CONTACT COVID-19 EXPOSURE within last 14 days AND [2] NO symptoms    Negative: [1] Has NOT completed COVID-19 vaccine series AND [2] was at a large indoor or outdoor event (e.g., concert, festival, rally, wedding) or been in crowded indoor setting AND [3] within last 14 days    Negative: [1] CLOSE CONTACT COVID-19 EXPOSURE within last 14 days AND [2] needs COVID-19 lab test to return to work AND [3] NO symptoms    Negative: [1] CLOSE CONTACT COVID-19 EXPOSURE within last 14 days AND [2] exposed person is a  (e.g., police or paramedic) AND [3] NO symptoms    Negative: [1] CLOSE CONTACT COVID-19 EXPOSURE within last 14 days AND [2] exposed person is a healthcare worker who was NOT using all recommended personal protective equipment (e.g., a respirator-N95 mask, eye protection, gloves, and gown) AND [3] NO symptoms    Negative: [1] Living or working in a correctional facility, long-term care facility, or shelter (i.e., congregate setting; densely populated) AND [2] where an outbreak has occurred AND [3] NO symptoms    Negative: COVID-19 lab test positive    Negative: [1] Lives with someone known to have influenza (flu test positive) AND [2] flu-like symptoms (e.g., cough, runny nose, sore throat, SOB; with or without fever)    Negative: [1]  Symptoms of COVID-19 (e.g., cough, fever, SOB, or others) AND [2] HCP diagnosed COVID-19 based on symptoms    Negative: [1] Symptoms of COVID-19 (e.g., cough, fever, SOB, or others) AND [2] lives in an area with community spread    Negative: [1] Symptoms of COVID-19 (e.g., cough, fever, SOB, or others) AND [2] within 14 days of EXPOSURE (close contact) with diagnosed or suspected COVID-19 patient    Negative: [1] Symptoms of COVID-19 (e.g., cough, fever, SOB, or others) AND [2] within 14 days of travel from high-risk area for COVID-19 community spread (identified by [facility])    Negative: [1] Difficulty breathing (shortness of breath) occurs AND [2] onset > 14 days after COVID-19 EXPOSURE (Close Contact)    Negative: [1] Dry cough occurs AND [2] onset > 14 days after COVID-19 EXPOSURE    Negative: [1] Wet cough (i.e., white-yellow, yellow, green, or gustavo colored sputum) AND [2] onset > 14 days after COVID-19 EXPOSURE    Negative: [1] Common cold symptoms AND [2] onset > 14 days after COVID-19 EXPOSURE    Negative: COVID-19 vaccine reaction suspected (e.g., fever, headache, muscle aches) occurring during days 1-3 after getting vaccine    Negative: COVID-19 vaccine, questions about    Negative: [1] COVID-19 EXPOSURE AND [2] 15 or more days ago AND [3] NO symptoms    Negative: [1] Living in area with community spread (identified by local PHD) BUT [2] NO symptoms    Negative: [1] Travel from area with community spread (identified by CDC) AND [2] within last 14 days BUT [3] NO symptoms    Negative: [1] No COVID-19 EXPOSURE BUT [2] living with someone who was exposed and who has no symptoms of COVID-19    Negative: [1] Caller concerned that exposure to COVID-19 occurred BUT [2] does not meet COVID-19 EXPOSURE criteria from CDC    Protocols used: CORONAVIRUS (COVID-19) EXPOSURE-A- 8.25.2021

## 2021-11-17 NOTE — PATIENT INSTRUCTIONS
"  Dear Rach Dick,    Based on your exposure to COVID-19 (coronavirus), we would like to test you for this virus. I have placed an order for this test.The best time for testing is 5-7 days after the exposure.    How to schedule:  Go to your [employer] home page and scroll down to the section that says  You have an appointment that needs to be scheduled  and click the large green button that says  Schedule Now  and follow the steps to find the next available opening.     If you are unable to complete these [facility] scheduling steps, please call [phone] to schedule your testing.     Return to work/school/ guidance:   For people with high risk exposures outside the home    Please let your workplace manager and staffing office know when your quarantine ends.     We can not give you an exact date as it depends on the information below. You can calculate this on your own or work with your manager/staffing office to calculate this. (For example if you were exposed on 10/4, you would have to quarantine for 14 full days. That would be through 10/18. You could return on 10/19.)    Quarantine Guidelines:  Patients (\"contacts\") who have been in close prolonged contact of an infected person(s) (within six feet for at least 15 minutes within a 24 hour period), and remain asymptomatic should enter quarantine based on the following options:    14-day quarantine period (this remains the CDC recommendation for the greatest protection against spread of COVID-19) OR    Minimum 7-day quarantine with negative RT-PCR test collected on day 5 or later OR    10-day quarantine with no test  Quarantine Guideline exceptions are as follows:    People who have been fully vaccinated do not need to quarantine if the exposure was at least 2 weeks after the last vaccination. This includes vaccinated health care workers.    Not fully vaccinated and unvaccinated Individuals who work in health care, congregate care, or congregate living " should be off work for 14 days from their last date of exposure. Community activities for this group can be resumed based on options above. Fully vaccinated individuals in this group do not need to quarantine from work after exposure.    Not fully vaccinated and unvaccinated people whose high-risk exposure was a household member should always quarantine for 14 days from their last date of exposure. Fully vaccinated people in this category do not need to quarantine.    Not fully vaccinated or unvaccinated residents of congregate care and congregate living settings should always quarantine for 14 days from their last date of exposure. Fully vaccinated residents do not need to quarantine.  Note: If you have ongoing exposure to the covid positive person, this quarantine period may be more than 14 days. (For example, if you are continued to be exposed to your child who tested positive and cannot isolate from them, then the quarantine of 7-14 days can't start until your child is no longer contagious. This is typically 10 days from onset of the child's symptoms. So the total duration may be 17-24 days in this case.)    You should continue symptom monitoring until day 14 post-exposure. If you develop signs or symptoms of COVID-19, isolate and get tested (even if you have been tested already).    How to quarantine:   Stay home and away from others. Don't go to school or anywhere else. Generally quarantine means staying home from work but there are some exceptions to this. Please contact your workplace.  No hugging, kissing or shaking hands.  Don't let anyone visit.  Cover your mouth and nose with a mask, tissue or washcloth to avoid spreading germs.  Wash your hands and face often. Use soap and water.    What are the symptoms of COVID-19?  The most common symptoms are cough, fever and trouble breathing. Less common symptoms include headache, body aches, fatigue (feeling very tired), chills, sore throat, stuffy or runny nose,  diarrhea (loose poop), loss of taste or smell, belly pain, and nausea or vomiting (feeling sick to your stomach or throwing up).  After 14 days, if you have still don't have symptoms, you likely don't have this virus.  If you develop symptoms, follow these guidelines.  If you're normally healthy: Please start another eVisit.  If you have a serious health problem (like cancer, heart failure, an organ transplant or kidney disease): Call your specialty clinic. Let them know that you might have COVID-19.    Where can I get more information?  [facility] - About COVID-19: [email or web address]  CDC - What to Do If You're Sick: www.cdc.gov/coronavirus/2019-ncov/about/steps-when-sick.html  CDC - Ending Home Isolation: www.cdc.gov/coronavirus/2019-ncov/hcp/disposition-in-home-patients.html  CDC - Caring for Someone: www.cdc.gov/coronavirus/2019-ncov/if-you-are-sick/care-for-someone.html  [facility] clinical trials (COVID-19 research studies): clinicalaffairs.[facility]-clinical-trials  Below are the COVID-19 hotlines at the [facility] ([facility]). Interpreters are available.  For health questions: Call 930-494-7294 or 1-806.141.8713 (7 a.m. to 7 p.m.)  For questions about schools and childcare: Call 810-069-0849 or 1-778.493.8571 (7 a.m. to 7 p.m.)        November 16, 2021  RE:  Rach Dick                                                                                                                   [id number] [facility] [address]      To whom it may concern:    I evaluated Rach Dick on November 16, 2021. Rach Dinhs should be excused from work/school.    They should let their workplace manager and staffing office know when their quarantine ends.    We can not give an exact date as it depends on the information below. They can calculate this on their own or work with their manager/staffing office to calculate this. (For example if they were exposed on  "10/04, they would have to quarantine for 14 full days. That would be through 10/18. They could return on 10/19.)    Quarantine Guidelines:    Patients (\"contacts\") who have been in close prolonged contact of an infected person(s) (within six feet for at least 15 minutes within a 24 hour period) and remain asymptomatic should enter quarantine based on the following options:      14-day quarantine period (this remains the CDC recommendation for the greatest protection against spread of COVID-19) OR    Minimum 7-day quarantine with negative RT-PCR test collected on day 5 or later OR    10-day quarantine with no test   Quarantine Guideline exceptions are as follows:    People who have been fully vaccinated do not need to quarantine if the exposure was at least 2 weeks after the last vaccination. This includes vaccinated health care workers.    Not fully vaccinated and unvaccinated Individuals who work in health care, congregate care, or congregate living should be off work for 14 days from their last date of exposure. Community activities for this group can be resumed based on options above. Fully vaccinated individuals in this group do not need to quarantine from work after exposure.    Not fully vaccinated and unvaccinated people whose high-risk exposure was a household member should always quarantine for 14 days from their last date of exposure. Fully vaccinated people in this category do not need to quarantine.    Not fully vaccinated or unvaccinated residents of congregate care and congregate living settings should always quarantine for 14 days from their last date of exposure. Fully vaccinated residents do not need to quarantine.    Note: If there is ongoing exposure to the covid positive person, this quarantine period may be longer than 14 days. (For example, if they are continually exposed to their child, who tested positive and cannot isolate from them, then the quarantine of 7-14 days can't start until their " child is no longer contagious. This is typically 10 days from onset to the child's symptoms. So the total duration may be 17-24 days in this case.)    Rach TESFAYE Mayelin should continue symptom monitoring until day 14 post-exposure. If they develop signs or symptoms of COVID-19, they should isolate and get tested (even if they have been tested already).    Sincerely,  MARCK FordC

## 2021-11-18 ENCOUNTER — LAB (OUTPATIENT)
Dept: FAMILY MEDICINE | Facility: CLINIC | Age: 66
End: 2021-11-18
Attending: PHYSICIAN ASSISTANT
Payer: COMMERCIAL

## 2021-11-18 DIAGNOSIS — Z20.822 CLOSE EXPOSURE TO 2019 NOVEL CORONAVIRUS: ICD-10-CM

## 2021-11-18 PROCEDURE — U0003 INFECTIOUS AGENT DETECTION BY NUCLEIC ACID (DNA OR RNA); SEVERE ACUTE RESPIRATORY SYNDROME CORONAVIRUS 2 (SARS-COV-2) (CORONAVIRUS DISEASE [COVID-19]), AMPLIFIED PROBE TECHNIQUE, MAKING USE OF HIGH THROUGHPUT TECHNOLOGIES AS DESCRIBED BY CMS-2020-01-R: HCPCS

## 2021-11-18 PROCEDURE — U0005 INFEC AGEN DETEC AMPLI PROBE: HCPCS

## 2021-11-19 LAB — SARS-COV-2 RNA RESP QL NAA+PROBE: NEGATIVE

## 2022-07-30 ENCOUNTER — HEALTH MAINTENANCE LETTER (OUTPATIENT)
Age: 67
End: 2022-07-30

## 2022-08-08 NOTE — TELEPHONE ENCOUNTER
"    Additional Information    Constant abdominal pain lasting > 2 hours    Answer Assessment - Initial Assessment Questions  1. LOCATION: \"Where does it hurt?\"       Left side upper hip area  2. RADIATION: \"Does the pain shoot anywhere else?\" (e.g., chest, back)      Sometimes down my leg, sometimes to my back  3. ONSET: \"When did the pain begin?\" (e.g., minutes, hours or days ago)       Tuesday after I ate ice cream cake  4. SUDDEN: \"Gradual or sudden onset?\"      Not asked  5. PATTERN \"Does the pain come and go, or is it constant?\"     - If constant: \"Is it getting better, staying the same, or worsening?\"       (Note: Constant means the pain never goes away completely; most serious pain is constant and it progresses)      - If intermittent: \"How long does it last?\" \"Do you have pain now?\"      (Note: Intermittent means the pain goes away completely between bouts)      constatn  6. SEVERITY: \"How bad is the pain?\"  (e.g., Scale 1-10; mild, moderate, or severe)    - MILD (1-3): doesn't interfere with normal activities, abdomen soft and not tender to touch     - MODERATE (4-7): interferes with normal activities or awakens from sleep, tender to touch     - SEVERE (8-10): excruciating pain, doubled over, unable to do any normal activities       3/10 and sometimes 7/10.  7. RECURRENT SYMPTOM: \"Have you ever had this type of abdominal pain before?\" If so, ask: \"When was the last time?\" and \"What happened that time?\"       Similar pain-hx of IBS, also has had kidney stones  8. CAUSE: \"What do you think is causing the abdominal pain?\"      Not sure but seems to be worse with eating  9. RELIEVING/AGGRAVATING FACTORS: \"What makes it better or worse?\" (e.g., movement, antacids, bowel movement)      Nothing-has bm yesterday.  10. OTHER SYMPTOMS: \"Has there been any vomiting, diarrhea, constipation, or urine problems?\"        no  11. PREGNANCY: \"Is there any chance you are pregnant?\" \"When was your last menstrual period?\"       "  N/a    Protocols used: ABDOMINAL PAIN - FEMALE-A-OH       No

## 2022-10-09 ENCOUNTER — HEALTH MAINTENANCE LETTER (OUTPATIENT)
Age: 67
End: 2022-10-09

## 2023-08-19 ENCOUNTER — HEALTH MAINTENANCE LETTER (OUTPATIENT)
Age: 68
End: 2023-08-19

## 2024-01-07 ENCOUNTER — NURSE TRIAGE (OUTPATIENT)
Dept: NURSING | Facility: CLINIC | Age: 69
End: 2024-01-07
Payer: COMMERCIAL

## 2024-10-12 ENCOUNTER — HEALTH MAINTENANCE LETTER (OUTPATIENT)
Age: 69
End: 2024-10-12